# Patient Record
Sex: FEMALE | Race: WHITE | NOT HISPANIC OR LATINO | URBAN - METROPOLITAN AREA
[De-identification: names, ages, dates, MRNs, and addresses within clinical notes are randomized per-mention and may not be internally consistent; named-entity substitution may affect disease eponyms.]

---

## 2017-03-09 ENCOUNTER — OUTPATIENT (OUTPATIENT)
Dept: OUTPATIENT SERVICES | Facility: HOSPITAL | Age: 42
LOS: 1 days | End: 2017-03-09

## 2017-03-09 VITALS
HEIGHT: 62 IN | SYSTOLIC BLOOD PRESSURE: 102 MMHG | HEART RATE: 76 BPM | TEMPERATURE: 99 F | WEIGHT: 141.98 LBS | RESPIRATION RATE: 14 BRPM | DIASTOLIC BLOOD PRESSURE: 72 MMHG

## 2017-03-09 DIAGNOSIS — M77.11 LATERAL EPICONDYLITIS, RIGHT ELBOW: ICD-10-CM

## 2017-03-09 DIAGNOSIS — Z90.89 ACQUIRED ABSENCE OF OTHER ORGANS: Chronic | ICD-10-CM

## 2017-03-09 LAB
HCT VFR BLD CALC: 40.5 % — SIGNIFICANT CHANGE UP (ref 34.5–45)
HGB BLD-MCNC: 13.4 G/DL — SIGNIFICANT CHANGE UP (ref 11.5–15.5)
MCHC RBC-ENTMCNC: 31 PG — SIGNIFICANT CHANGE UP (ref 27–34)
MCHC RBC-ENTMCNC: 33.1 % — SIGNIFICANT CHANGE UP (ref 32–36)
MCV RBC AUTO: 93.8 FL — SIGNIFICANT CHANGE UP (ref 80–100)
PLATELET # BLD AUTO: 321 K/UL — SIGNIFICANT CHANGE UP (ref 150–400)
PMV BLD: 10.7 FL — SIGNIFICANT CHANGE UP (ref 7–13)
RBC # BLD: 4.32 M/UL — SIGNIFICANT CHANGE UP (ref 3.8–5.2)
RBC # FLD: 13.3 % — SIGNIFICANT CHANGE UP (ref 10.3–14.5)
WBC # BLD: 9.17 K/UL — SIGNIFICANT CHANGE UP (ref 3.8–10.5)
WBC # FLD AUTO: 9.17 K/UL — SIGNIFICANT CHANGE UP (ref 3.8–10.5)

## 2017-03-09 NOTE — H&P PST ADULT - MUSCULOSKELETAL
details… detailed exam no joint warmth/no joint erythema/decreased ROM/no joint swelling/normal strength/right elbow/no calf tenderness

## 2017-03-09 NOTE — H&P PST ADULT - FAMILY HISTORY
Father  Still living? Yes, Estimated age: 61-70  Family history of hypertension, Age at diagnosis: Age Unknown

## 2017-03-09 NOTE — H&P PST ADULT - NSANTHOSAYNRD_GEN_A_CORE
No. AKIL screening performed.  STOP BANG Legend: 0-2 = LOW Risk; 3-4 = INTERMEDIATE Risk; 5-8 = HIGH Risk

## 2017-03-09 NOTE — H&P PST ADULT - RS GEN PE MLT RESP DETAILS PC
no rhonchi/clear to auscultation bilaterally/no wheezes/respirations non-labored/good air movement/no rales/breath sounds equal

## 2017-03-09 NOTE — H&P PST ADULT - PMH
Herniated lumbar intervertebral disc  L4-5 , epidural 1 year ago - with relief  Pancreatitis  age 16

## 2017-03-09 NOTE — H&P PST ADULT - HISTORY OF PRESENT ILLNESS
This is a 42 y.o. female who presented to DR Johnston over 1 year ago , complaining of right arm discomfort . Pt evaluated , had x-ray of right arm . Pt recently evaluate by Dr Johnston . Pt has lateral epicondylitis ,right elbow. Pt now for surgery .

## 2017-03-09 NOTE — H&P PST ADULT - LYMPHATIC
supraclavicular R/posterior cervical L/supraclavicular L/anterior cervical R/anterior cervical L/posterior cervical R

## 2017-03-14 ENCOUNTER — TRANSCRIPTION ENCOUNTER (OUTPATIENT)
Age: 42
End: 2017-03-14

## 2017-03-15 ENCOUNTER — OUTPATIENT (OUTPATIENT)
Dept: OUTPATIENT SERVICES | Facility: HOSPITAL | Age: 42
LOS: 1 days | Discharge: HOME | End: 2017-03-15

## 2017-03-15 VITALS
DIASTOLIC BLOOD PRESSURE: 70 MMHG | HEART RATE: 82 BPM | RESPIRATION RATE: 16 BRPM | OXYGEN SATURATION: 98 % | TEMPERATURE: 98 F | SYSTOLIC BLOOD PRESSURE: 100 MMHG

## 2017-03-15 VITALS
WEIGHT: 141.98 LBS | RESPIRATION RATE: 14 BRPM | TEMPERATURE: 99 F | HEART RATE: 71 BPM | HEIGHT: 62 IN | OXYGEN SATURATION: 100 % | SYSTOLIC BLOOD PRESSURE: 102 MMHG | DIASTOLIC BLOOD PRESSURE: 74 MMHG

## 2017-03-15 DIAGNOSIS — Z90.89 ACQUIRED ABSENCE OF OTHER ORGANS: Chronic | ICD-10-CM

## 2017-03-15 DIAGNOSIS — M77.11 LATERAL EPICONDYLITIS, RIGHT ELBOW: ICD-10-CM

## 2017-03-15 RX ORDER — OXYCODONE HYDROCHLORIDE 5 MG/1
1 TABLET ORAL
Qty: 24 | Refills: 0
Start: 2017-03-15

## 2017-03-15 NOTE — ASU DISCHARGE PLAN (ADULT/PEDIATRIC). - NOTIFY
Numbness, color, or temperature change to extremity/Fever greater than 101/Pain not relieved by Medications/Persistent Nausea and Vomiting/Unable to Urinate/Bleeding that does not stop/Swelling that continues

## 2017-03-15 NOTE — ASU DISCHARGE PLAN (ADULT/PEDIATRIC). - NURSING INSTRUCTIONS
narcotic pain medicine is constipating, buy over the counter stool softener and take as instructed on the bottle

## 2017-05-19 ENCOUNTER — EMERGENCY (EMERGENCY)
Facility: HOSPITAL | Age: 42
LOS: 1 days | Discharge: HOME | End: 2017-05-19
Attending: EMERGENCY MEDICINE | Admitting: EMERGENCY MEDICINE
Payer: COMMERCIAL

## 2017-05-19 VITALS
RESPIRATION RATE: 16 BRPM | DIASTOLIC BLOOD PRESSURE: 81 MMHG | SYSTOLIC BLOOD PRESSURE: 128 MMHG | TEMPERATURE: 98 F | HEART RATE: 106 BPM

## 2017-05-19 DIAGNOSIS — Z90.89 ACQUIRED ABSENCE OF OTHER ORGANS: Chronic | ICD-10-CM

## 2017-05-19 LAB
ALBUMIN SERPL ELPH-MCNC: 4 G/DL — SIGNIFICANT CHANGE UP (ref 3.3–5)
ALP SERPL-CCNC: 54 U/L — SIGNIFICANT CHANGE UP (ref 40–120)
ALT FLD-CCNC: 9 U/L — SIGNIFICANT CHANGE UP (ref 4–33)
APPEARANCE UR: SIGNIFICANT CHANGE UP
AST SERPL-CCNC: 12 U/L — SIGNIFICANT CHANGE UP (ref 4–32)
B PERT DNA SPEC QL NAA+PROBE: SIGNIFICANT CHANGE UP
BACTERIA # UR AUTO: SIGNIFICANT CHANGE UP
BASE EXCESS BLDV CALC-SCNC: 0.5 MMOL/L — SIGNIFICANT CHANGE UP
BASOPHILS # BLD AUTO: 0.03 K/UL — SIGNIFICANT CHANGE UP (ref 0–0.2)
BASOPHILS NFR BLD AUTO: 0.2 % — SIGNIFICANT CHANGE UP (ref 0–2)
BILIRUB SERPL-MCNC: 0.7 MG/DL — SIGNIFICANT CHANGE UP (ref 0.2–1.2)
BILIRUB UR-MCNC: NEGATIVE — SIGNIFICANT CHANGE UP
BLOOD GAS VENOUS - CREATININE: 0.77 MG/DL — SIGNIFICANT CHANGE UP (ref 0.5–1.3)
BLOOD UR QL VISUAL: HIGH
BUN SERPL-MCNC: 9 MG/DL — SIGNIFICANT CHANGE UP (ref 7–23)
C PNEUM DNA SPEC QL NAA+PROBE: NOT DETECTED — SIGNIFICANT CHANGE UP
CALCIUM SERPL-MCNC: 8.8 MG/DL — SIGNIFICANT CHANGE UP (ref 8.4–10.5)
CHLORIDE BLDV-SCNC: 107 MMOL/L — SIGNIFICANT CHANGE UP (ref 96–108)
CHLORIDE SERPL-SCNC: 104 MMOL/L — SIGNIFICANT CHANGE UP (ref 98–107)
CO2 SERPL-SCNC: 22 MMOL/L — SIGNIFICANT CHANGE UP (ref 22–31)
COLOR SPEC: YELLOW — SIGNIFICANT CHANGE UP
CREAT SERPL-MCNC: 0.76 MG/DL — SIGNIFICANT CHANGE UP (ref 0.5–1.3)
EOSINOPHIL # BLD AUTO: 0.03 K/UL — SIGNIFICANT CHANGE UP (ref 0–0.5)
EOSINOPHIL NFR BLD AUTO: 0.2 % — SIGNIFICANT CHANGE UP (ref 0–6)
FLUAV H1 2009 PAND RNA SPEC QL NAA+PROBE: NOT DETECTED — SIGNIFICANT CHANGE UP
FLUAV H1 RNA SPEC QL NAA+PROBE: NOT DETECTED — SIGNIFICANT CHANGE UP
FLUAV H3 RNA SPEC QL NAA+PROBE: NOT DETECTED — SIGNIFICANT CHANGE UP
FLUAV SUBTYP SPEC NAA+PROBE: SIGNIFICANT CHANGE UP
FLUBV RNA SPEC QL NAA+PROBE: NOT DETECTED — SIGNIFICANT CHANGE UP
GAS PNL BLDV: 136 MMOL/L — SIGNIFICANT CHANGE UP (ref 136–146)
GLUCOSE BLDV-MCNC: 83 — SIGNIFICANT CHANGE UP (ref 70–99)
GLUCOSE SERPL-MCNC: 79 MG/DL — SIGNIFICANT CHANGE UP (ref 70–99)
GLUCOSE UR-MCNC: NEGATIVE — SIGNIFICANT CHANGE UP
HADV DNA SPEC QL NAA+PROBE: NOT DETECTED — SIGNIFICANT CHANGE UP
HBA1C BLD-MCNC: 5.2 % — SIGNIFICANT CHANGE UP (ref 4–5.6)
HCG SERPL-ACNC: < 5 MIU/ML — SIGNIFICANT CHANGE UP
HCO3 BLDV-SCNC: 23 MMOL/L — SIGNIFICANT CHANGE UP (ref 20–27)
HCOV 229E RNA SPEC QL NAA+PROBE: NOT DETECTED — SIGNIFICANT CHANGE UP
HCOV HKU1 RNA SPEC QL NAA+PROBE: NOT DETECTED — SIGNIFICANT CHANGE UP
HCOV NL63 RNA SPEC QL NAA+PROBE: NOT DETECTED — SIGNIFICANT CHANGE UP
HCOV OC43 RNA SPEC QL NAA+PROBE: NOT DETECTED — SIGNIFICANT CHANGE UP
HCT VFR BLD CALC: 40.4 % — SIGNIFICANT CHANGE UP (ref 34.5–45)
HCT VFR BLDV CALC: 43.5 % — SIGNIFICANT CHANGE UP (ref 34.5–45)
HETEROPH AB TITR SER AGGL: NEGATIVE — SIGNIFICANT CHANGE UP
HGB BLD-MCNC: 13.5 G/DL — SIGNIFICANT CHANGE UP (ref 11.5–15.5)
HGB BLDV-MCNC: 14.2 G/DL — SIGNIFICANT CHANGE UP (ref 11.5–15.5)
HMPV RNA SPEC QL NAA+PROBE: NOT DETECTED — SIGNIFICANT CHANGE UP
HPIV1 RNA SPEC QL NAA+PROBE: NOT DETECTED — SIGNIFICANT CHANGE UP
HPIV2 RNA SPEC QL NAA+PROBE: NOT DETECTED — SIGNIFICANT CHANGE UP
HPIV3 RNA SPEC QL NAA+PROBE: NOT DETECTED — SIGNIFICANT CHANGE UP
HPIV4 RNA SPEC QL NAA+PROBE: NOT DETECTED — SIGNIFICANT CHANGE UP
IMM GRANULOCYTES NFR BLD AUTO: 0.4 % — SIGNIFICANT CHANGE UP (ref 0–1.5)
KETONES UR-MCNC: SIGNIFICANT CHANGE UP
LACTATE BLDV-MCNC: 1.4 MMOL/L — SIGNIFICANT CHANGE UP (ref 0.5–2)
LEUKOCYTE ESTERASE UR-ACNC: HIGH
LYMPHOCYTES # BLD AUTO: 1.05 K/UL — SIGNIFICANT CHANGE UP (ref 1–3.3)
LYMPHOCYTES # BLD AUTO: 7.4 % — LOW (ref 13–44)
M PNEUMO DNA SPEC QL NAA+PROBE: NOT DETECTED — SIGNIFICANT CHANGE UP
MCHC RBC-ENTMCNC: 31.1 PG — SIGNIFICANT CHANGE UP (ref 27–34)
MCHC RBC-ENTMCNC: 33.4 % — SIGNIFICANT CHANGE UP (ref 32–36)
MCV RBC AUTO: 93.1 FL — SIGNIFICANT CHANGE UP (ref 80–100)
MONOCYTES # BLD AUTO: 0.66 K/UL — SIGNIFICANT CHANGE UP (ref 0–0.9)
MONOCYTES NFR BLD AUTO: 4.7 % — SIGNIFICANT CHANGE UP (ref 2–14)
MUCOUS THREADS # UR AUTO: SIGNIFICANT CHANGE UP
NEUTROPHILS # BLD AUTO: 12.31 K/UL — HIGH (ref 1.8–7.4)
NEUTROPHILS NFR BLD AUTO: 87.1 % — HIGH (ref 43–77)
NITRITE UR-MCNC: NEGATIVE — SIGNIFICANT CHANGE UP
NON-SQ EPI CELLS # UR AUTO: <1 — SIGNIFICANT CHANGE UP
PCO2 BLDV: 45 MMHG — SIGNIFICANT CHANGE UP (ref 41–51)
PH BLDV: 7.37 PH — SIGNIFICANT CHANGE UP (ref 7.32–7.43)
PH UR: 6 — SIGNIFICANT CHANGE UP (ref 4.6–8)
PLATELET # BLD AUTO: 227 K/UL — SIGNIFICANT CHANGE UP (ref 150–400)
PMV BLD: 10.2 FL — SIGNIFICANT CHANGE UP (ref 7–13)
PO2 BLDV: 27 MMHG — LOW (ref 35–40)
POTASSIUM BLDV-SCNC: 3.4 MMOL/L — SIGNIFICANT CHANGE UP (ref 3.4–4.5)
POTASSIUM SERPL-MCNC: 4.1 MMOL/L — SIGNIFICANT CHANGE UP (ref 3.5–5.3)
POTASSIUM SERPL-SCNC: 4.1 MMOL/L — SIGNIFICANT CHANGE UP (ref 3.5–5.3)
PROT SERPL-MCNC: 7.3 G/DL — SIGNIFICANT CHANGE UP (ref 6–8.3)
PROT UR-MCNC: 100 — HIGH
RBC # BLD: 4.34 M/UL — SIGNIFICANT CHANGE UP (ref 3.8–5.2)
RBC # FLD: 13.1 % — SIGNIFICANT CHANGE UP (ref 10.3–14.5)
RBC CASTS # UR COMP ASSIST: HIGH (ref 0–?)
RSV RNA SPEC QL NAA+PROBE: NOT DETECTED — SIGNIFICANT CHANGE UP
RV+EV RNA SPEC QL NAA+PROBE: NOT DETECTED — SIGNIFICANT CHANGE UP
SAO2 % BLDV: 44.2 % — LOW (ref 60–85)
SODIUM SERPL-SCNC: 141 MMOL/L — SIGNIFICANT CHANGE UP (ref 135–145)
SP GR SPEC: 1.03 — HIGH (ref 1–1.03)
SQUAMOUS # UR AUTO: SIGNIFICANT CHANGE UP
TROPONIN T SERPL-MCNC: < 0.06 NG/ML — SIGNIFICANT CHANGE UP (ref 0–0.06)
TROPONIN T SERPL-MCNC: < 0.06 NG/ML — SIGNIFICANT CHANGE UP (ref 0–0.06)
TSH SERPL-MCNC: 1.13 UIU/ML — SIGNIFICANT CHANGE UP (ref 0.27–4.2)
UROBILINOGEN FLD QL: NORMAL E.U. — SIGNIFICANT CHANGE UP (ref 0.1–0.2)
WBC # BLD: 14.14 K/UL — HIGH (ref 3.8–10.5)
WBC # FLD AUTO: 14.14 K/UL — HIGH (ref 3.8–10.5)
WBC UR QL: HIGH (ref 0–?)

## 2017-05-19 PROCEDURE — 99220: CPT

## 2017-05-19 PROCEDURE — 71020: CPT | Mod: 26

## 2017-05-19 PROCEDURE — 71275 CT ANGIOGRAPHY CHEST: CPT | Mod: 26

## 2017-05-19 RX ORDER — CEFTRIAXONE 500 MG/1
1 INJECTION, POWDER, FOR SOLUTION INTRAMUSCULAR; INTRAVENOUS ONCE
Qty: 0 | Refills: 0 | Status: COMPLETED | OUTPATIENT
Start: 2017-05-19 | End: 2017-05-19

## 2017-05-19 RX ORDER — KETOROLAC TROMETHAMINE 30 MG/ML
30 SYRINGE (ML) INJECTION ONCE
Qty: 0 | Refills: 0 | Status: DISCONTINUED | OUTPATIENT
Start: 2017-05-19 | End: 2017-05-19

## 2017-05-19 RX ORDER — CEFTRIAXONE 500 MG/1
1 INJECTION, POWDER, FOR SOLUTION INTRAMUSCULAR; INTRAVENOUS EVERY 24 HOURS
Qty: 0 | Refills: 0 | Status: DISCONTINUED | OUTPATIENT
Start: 2017-05-20 | End: 2017-05-23

## 2017-05-19 RX ORDER — SODIUM CHLORIDE 9 MG/ML
2000 INJECTION INTRAMUSCULAR; INTRAVENOUS; SUBCUTANEOUS ONCE
Qty: 0 | Refills: 0 | Status: COMPLETED | OUTPATIENT
Start: 2017-05-19 | End: 2017-05-19

## 2017-05-19 RX ORDER — SODIUM CHLORIDE 9 MG/ML
1000 INJECTION INTRAMUSCULAR; INTRAVENOUS; SUBCUTANEOUS
Qty: 0 | Refills: 0 | Status: DISCONTINUED | OUTPATIENT
Start: 2017-05-19 | End: 2017-05-20

## 2017-05-19 RX ORDER — ACETAMINOPHEN 500 MG
975 TABLET ORAL ONCE
Qty: 0 | Refills: 0 | Status: COMPLETED | OUTPATIENT
Start: 2017-05-19 | End: 2017-05-19

## 2017-05-19 RX ORDER — SODIUM CHLORIDE 9 MG/ML
1000 INJECTION INTRAMUSCULAR; INTRAVENOUS; SUBCUTANEOUS ONCE
Qty: 0 | Refills: 0 | Status: COMPLETED | OUTPATIENT
Start: 2017-05-19 | End: 2017-05-19

## 2017-05-19 RX ADMIN — Medication 975 MILLIGRAM(S): at 17:05

## 2017-05-19 RX ADMIN — SODIUM CHLORIDE 2000 MILLILITER(S): 9 INJECTION INTRAMUSCULAR; INTRAVENOUS; SUBCUTANEOUS at 12:32

## 2017-05-19 RX ADMIN — SODIUM CHLORIDE 150 MILLILITER(S): 9 INJECTION INTRAMUSCULAR; INTRAVENOUS; SUBCUTANEOUS at 21:47

## 2017-05-19 RX ADMIN — SODIUM CHLORIDE 2000 MILLILITER(S): 9 INJECTION INTRAMUSCULAR; INTRAVENOUS; SUBCUTANEOUS at 17:36

## 2017-05-19 RX ADMIN — Medication 30 MILLIGRAM(S): at 23:56

## 2017-05-19 RX ADMIN — CEFTRIAXONE 100 GRAM(S): 500 INJECTION, POWDER, FOR SOLUTION INTRAMUSCULAR; INTRAVENOUS at 15:52

## 2017-05-19 NOTE — ED CDU PROVIDER NOTE - PROGRESS NOTE DETAILS
PHILOMENA Sesay: Received signout from PHILOMENA Lewis- pt is afebrile at this time, however she remains tachycardic to 104. Pending CE #2, will hydrate, repeat labs in the am and continue qd rocephin. PA Baseil: Pt c/o chest pain, similar to previous episode. EKG rechecked, unchanged. CP is positional with laying back and relieved with sitting up/forward. Will give toradol, continue to monitor and observe, consider echo in the am. Azalia CDU attendin yo woman presenting with episodic fever for 2 days, episode of chest pain.  TSH wnl, no fever overnight.  Patient did report episode of chest pain overnight.  Serial troponin negative.  No evidence of ACS, pericarditis, myocarditis, pulmonary embolism,  pneumothorax, pneumonia, Zoster, or esophageal perforation.  Historically not abrupt in onset, tearing or ripping, pulses symmetric, no e/o aortic dissection.  Echo pending, prelim blood culture pending. PA Baseil: Pt c/o chest pain, similar to previous episode. EKG rechecked, unchanged. CP is positional with laying back and relieved with sitting up/forward. Will give toradol, continue to monitor and observe, consider echo in the am.  Azalia att: I performed a face to face evaluation of this patient and obtained a history and performed a full exam.  I agree with the history, physical exam and plan of the PA. PHILOMENA Payton: pt NAD, vitals improved, afebrile. Discussed results of labs/imaging with the patient. Advised to follow up with PCP PHILOMENA Payton: pt NAD, vitals improved, afebrile. Patient finishing second dose of ceftriaxone now. Discussed results of labs/imaging with the patient. Advised to follow up with PCP. PHILOMENA Payton: pt NAD, vitals improved, afebrile. Patient finishing second dose of ceftriaxone now. Discussed results of labs/imaging with the patient. Advised to follow up with PCP.  Azalia att: I performed a face to face evaluation of this patient and obtained a history and performed a full exam.  I agree with the history, physical exam and plan of the PA. Azalia CDU attending discharge summary: 43 yo woman presenting with episodic fever for 2 days, episode of chest pain.  TSH wnl, no fever overnight.  Patient did report episode of chest pain overnight.  Preliminary blood cultures negative, urine culture negative.  Reevaluation, no meningismus, cardiac RRR no m/r/gs, chest clear, abdomen s/nt/nd, no rash.  No e/o encephalitis, bacterial meningitis, pneumonia, soft tissue infection, joint infection, meningococcemia, intraabdominal infection such as appendicitis, cholecystitis, no e/o UTI, no risk factors or findings concerning for endocarditis, no e/o lyme disease, or RMSF.  Patient informed of ED visit findings, understands plan.  Patient provided with written and further verbal instructions not included in discharge paperwork.  Patient instructed to follow up with their primary care physician in 2-3 days and return for new, worsened, or persistent symptoms.  Rx keflex.

## 2017-05-19 NOTE — ED CDU PROVIDER NOTE - ATTENDING CONTRIBUTION TO CARE
Dr. Rincon Case of a  41 y/o female patient that presented to the ED due to episodic fever for 2 days and episode of chest pain. Labs within normal, sent to CDU for observation and monitoring of blood cultures. Agree with PA's physical exam, assessment and documentation

## 2017-05-19 NOTE — ED ADULT NURSE REASSESSMENT NOTE - NS ED NURSE REASSESS COMMENT FT1
pt currently denies chest pain  or SOB. c/o body wide chills. pt currently febrile and tachycardic again. will give Tylenol as per Dr. Vieyra s/p CTA

## 2017-05-19 NOTE — ED PROVIDER NOTE - OBJECTIVE STATEMENT
41 yo woman presenting with fever for 2 days.  Waxing and waning, onset at rest, nonradiating, not better or worse with anything, moderate in severity.  Reports an isolated episode of chest pain, left sided, sharp, lasted seconds, onset at rest, moderate in severity.  No cough, sore throat, runny nose, abdominal pain, dysuria or frequency, rash, tick bite or outdoor activity c/w tick.

## 2017-05-19 NOTE — ED PROVIDER NOTE - PHYSICAL EXAMINATION
Azalia att: General: Well appearing, nontoxic, no acute distress; Head: Normocephalic Atraumatic; Eyes: PERRL, EOMI; ENT: Airway patent; Neck: supple, no meningismus; Chest: Lungs clear to auscultation bilateral; Cardiac: Regular rate and rhythm, no murmurs, rubs or gallops; Abdomen: soft, nontender, nondistended; no guarding or rebound; Musculoskeletal: Calves symmetric, nontender, no palpable cord; Skin: No rash, palms and soles clear, nails and digits wnl, normal skin tone; Neuro: Alert and Oriented to person, place, and time; No focal deficit, CN 2-12 symmetric and intact

## 2017-05-19 NOTE — ED CDU PROVIDER NOTE - PLAN OF CARE
Take Tylenol 650mg every 4 hours as needed for fever or pain.  Take Keflex 500mg three times a day for 7 days - for urinary tract infection.  Follow up with your primary care physician within 1 week for further evaluation.  Return to the Emergency Department for any new, worsening or concerning symptoms.

## 2017-05-19 NOTE — ED CDU PROVIDER NOTE - OBJECTIVE STATEMENT
41 yo woman presenting with fever for 2 days.  Waxing and waning, onset at rest, nonradiating, not better or worse with anything, moderate in severity.  Reports an isolated episode of chest pain, left sided, sharp, lasted seconds, onset at rest, moderate in severity.  No cough, sore throat, runny nose, abdominal pain, dysuria or frequency, rash, tick bite or outdoor activity c/w tick.    CDU: 41 yo old no PMHX with fever for 2 days, intermittent. And isoloated CP. one episode of vomiting, nausea. No recent uri, or illness, no recent travel, abdominal pain, urinary sx.

## 2017-05-19 NOTE — ED ADULT TRIAGE NOTE - CHIEF COMPLAINT QUOTE
Pt c/o fever  , L sided chest discomfort now resolved ,  body ache , and 1 episode of N/V last night.  Denies diarrhea, sob.  Pt took advil x 2 at 8am

## 2017-05-19 NOTE — ED PROVIDER NOTE - CARE PLAN
Principal Discharge DX:	Sinus tachycardia  Secondary Diagnosis:	Acute chest pain  Secondary Diagnosis:	Fever, unspecified fever cause

## 2017-05-20 VITALS
DIASTOLIC BLOOD PRESSURE: 71 MMHG | RESPIRATION RATE: 16 BRPM | SYSTOLIC BLOOD PRESSURE: 119 MMHG | OXYGEN SATURATION: 100 % | HEART RATE: 98 BPM | TEMPERATURE: 98 F

## 2017-05-20 LAB
ALBUMIN SERPL ELPH-MCNC: 3.1 G/DL — LOW (ref 3.3–5)
ALP SERPL-CCNC: 45 U/L — SIGNIFICANT CHANGE UP (ref 40–120)
ALT FLD-CCNC: 17 U/L — SIGNIFICANT CHANGE UP (ref 4–33)
AST SERPL-CCNC: 30 U/L — SIGNIFICANT CHANGE UP (ref 4–32)
BASE EXCESS BLDV CALC-SCNC: -1.8 MMOL/L — SIGNIFICANT CHANGE UP
BASOPHILS # BLD AUTO: 0.02 K/UL — SIGNIFICANT CHANGE UP (ref 0–0.2)
BASOPHILS NFR BLD AUTO: 0.2 % — SIGNIFICANT CHANGE UP (ref 0–2)
BILIRUB SERPL-MCNC: 0.2 MG/DL — SIGNIFICANT CHANGE UP (ref 0.2–1.2)
BLOOD GAS VENOUS - CREATININE: 0.56 MG/DL — SIGNIFICANT CHANGE UP (ref 0.5–1.3)
BUN SERPL-MCNC: 5 MG/DL — LOW (ref 7–23)
CALCIUM SERPL-MCNC: 7.7 MG/DL — LOW (ref 8.4–10.5)
CHLORIDE BLDV-SCNC: 116 MMOL/L — HIGH (ref 96–108)
CHLORIDE SERPL-SCNC: 110 MMOL/L — HIGH (ref 98–107)
CK MB BLD-MCNC: 1 NG/ML — SIGNIFICANT CHANGE UP (ref 1–4.7)
CK SERPL-CCNC: 87 U/L — SIGNIFICANT CHANGE UP (ref 25–170)
CO2 SERPL-SCNC: 20 MMOL/L — LOW (ref 22–31)
CREAT SERPL-MCNC: 0.59 MG/DL — SIGNIFICANT CHANGE UP (ref 0.5–1.3)
EOSINOPHIL # BLD AUTO: 0.23 K/UL — SIGNIFICANT CHANGE UP (ref 0–0.5)
EOSINOPHIL NFR BLD AUTO: 2.3 % — SIGNIFICANT CHANGE UP (ref 0–6)
GAS PNL BLDV: 133 MMOL/L — LOW (ref 136–146)
GLUCOSE BLDV-MCNC: 97 — SIGNIFICANT CHANGE UP (ref 70–99)
GLUCOSE SERPL-MCNC: 99 MG/DL — SIGNIFICANT CHANGE UP (ref 70–99)
HCO3 BLDV-SCNC: 23 MMOL/L — SIGNIFICANT CHANGE UP (ref 20–27)
HCT VFR BLD CALC: 34.5 % — SIGNIFICANT CHANGE UP (ref 34.5–45)
HCT VFR BLDV CALC: 35.2 % — SIGNIFICANT CHANGE UP (ref 34.5–45)
HGB BLD-MCNC: 11.2 G/DL — LOW (ref 11.5–15.5)
HGB BLDV-MCNC: 11.4 G/DL — LOW (ref 11.5–15.5)
IMM GRANULOCYTES NFR BLD AUTO: 0.4 % — SIGNIFICANT CHANGE UP (ref 0–1.5)
LACTATE BLDV-MCNC: 0.6 MMOL/L — SIGNIFICANT CHANGE UP (ref 0.5–2)
LYMPHOCYTES # BLD AUTO: 1.34 K/UL — SIGNIFICANT CHANGE UP (ref 1–3.3)
LYMPHOCYTES # BLD AUTO: 13.4 % — SIGNIFICANT CHANGE UP (ref 13–44)
MCHC RBC-ENTMCNC: 30.3 PG — SIGNIFICANT CHANGE UP (ref 27–34)
MCHC RBC-ENTMCNC: 32.5 % — SIGNIFICANT CHANGE UP (ref 32–36)
MCV RBC AUTO: 93.2 FL — SIGNIFICANT CHANGE UP (ref 80–100)
MONOCYTES # BLD AUTO: 0.81 K/UL — SIGNIFICANT CHANGE UP (ref 0–0.9)
MONOCYTES NFR BLD AUTO: 8.1 % — SIGNIFICANT CHANGE UP (ref 2–14)
NEUTROPHILS # BLD AUTO: 7.55 K/UL — HIGH (ref 1.8–7.4)
NEUTROPHILS NFR BLD AUTO: 75.6 % — SIGNIFICANT CHANGE UP (ref 43–77)
PCO2 BLDV: 33 MMHG — LOW (ref 41–51)
PH BLDV: 7.43 PH — SIGNIFICANT CHANGE UP (ref 7.32–7.43)
PLATELET # BLD AUTO: 214 K/UL — SIGNIFICANT CHANGE UP (ref 150–400)
PMV BLD: 10.2 FL — SIGNIFICANT CHANGE UP (ref 7–13)
PO2 BLDV: 59 MMHG — HIGH (ref 35–40)
POTASSIUM BLDV-SCNC: 3.7 MMOL/L — SIGNIFICANT CHANGE UP (ref 3.4–4.5)
POTASSIUM SERPL-MCNC: 4.4 MMOL/L — SIGNIFICANT CHANGE UP (ref 3.5–5.3)
POTASSIUM SERPL-SCNC: 4.4 MMOL/L — SIGNIFICANT CHANGE UP (ref 3.5–5.3)
PROT SERPL-MCNC: 6.3 G/DL — SIGNIFICANT CHANGE UP (ref 6–8.3)
RBC # BLD: 3.7 M/UL — LOW (ref 3.8–5.2)
RBC # FLD: 13.4 % — SIGNIFICANT CHANGE UP (ref 10.3–14.5)
SAO2 % BLDV: 92.1 % — HIGH (ref 60–85)
SODIUM SERPL-SCNC: 143 MMOL/L — SIGNIFICANT CHANGE UP (ref 135–145)
SPECIMEN SOURCE: SIGNIFICANT CHANGE UP
TROPONIN T SERPL-MCNC: < 0.06 NG/ML — SIGNIFICANT CHANGE UP (ref 0–0.06)
WBC # BLD: 9.99 K/UL — SIGNIFICANT CHANGE UP (ref 3.8–10.5)
WBC # FLD AUTO: 9.99 K/UL — SIGNIFICANT CHANGE UP (ref 3.8–10.5)

## 2017-05-20 PROCEDURE — 99217: CPT

## 2017-05-20 PROCEDURE — 93306 TTE W/DOPPLER COMPLETE: CPT | Mod: 26

## 2017-05-20 RX ORDER — METOCLOPRAMIDE HCL 10 MG
10 TABLET ORAL ONCE
Qty: 0 | Refills: 0 | Status: DISCONTINUED | OUTPATIENT
Start: 2017-05-20 | End: 2017-05-20

## 2017-05-20 RX ORDER — METOCLOPRAMIDE HCL 10 MG
10 TABLET ORAL ONCE
Qty: 0 | Refills: 0 | Status: COMPLETED | OUTPATIENT
Start: 2017-05-20 | End: 2017-05-20

## 2017-05-20 RX ORDER — CEPHALEXIN 500 MG
1 CAPSULE ORAL
Qty: 21 | Refills: 0
Start: 2017-05-20 | End: 2017-05-27

## 2017-05-20 RX ADMIN — Medication 30 MILLIGRAM(S): at 00:25

## 2017-05-20 RX ADMIN — Medication 10 MILLIGRAM(S): at 16:03

## 2017-05-20 RX ADMIN — CEFTRIAXONE 100 GRAM(S): 500 INJECTION, POWDER, FOR SOLUTION INTRAMUSCULAR; INTRAVENOUS at 16:56

## 2017-05-20 RX ADMIN — SODIUM CHLORIDE 150 MILLILITER(S): 9 INJECTION INTRAMUSCULAR; INTRAVENOUS; SUBCUTANEOUS at 06:32

## 2017-05-21 LAB — BACTERIA UR CULT: SIGNIFICANT CHANGE UP

## 2017-05-24 LAB
BACTERIA BLD CULT: SIGNIFICANT CHANGE UP
BACTERIA BLD CULT: SIGNIFICANT CHANGE UP

## 2018-09-27 ENCOUNTER — RESULT REVIEW (OUTPATIENT)
Age: 43
End: 2018-09-27

## 2020-03-18 ENCOUNTER — APPOINTMENT (OUTPATIENT)
Dept: UROGYNECOLOGY | Facility: CLINIC | Age: 45
End: 2020-03-18

## 2020-03-30 PROBLEM — Z00.00 ENCOUNTER FOR PREVENTIVE HEALTH EXAMINATION: Status: ACTIVE | Noted: 2020-03-30

## 2020-05-08 ENCOUNTER — APPOINTMENT (OUTPATIENT)
Dept: UROGYNECOLOGY | Facility: CLINIC | Age: 45
End: 2020-05-08

## 2020-10-14 ENCOUNTER — RESULT REVIEW (OUTPATIENT)
Age: 45
End: 2020-10-14

## 2022-03-03 ENCOUNTER — APPOINTMENT (OUTPATIENT)
Dept: OBGYN | Facility: CLINIC | Age: 47
End: 2022-03-03
Payer: COMMERCIAL

## 2022-03-03 ENCOUNTER — TRANSCRIPTION ENCOUNTER (OUTPATIENT)
Age: 47
End: 2022-03-03

## 2022-03-03 VITALS
BODY MASS INDEX: 25.76 KG/M2 | WEIGHT: 140 LBS | DIASTOLIC BLOOD PRESSURE: 70 MMHG | SYSTOLIC BLOOD PRESSURE: 118 MMHG | HEIGHT: 62 IN

## 2022-03-03 DIAGNOSIS — Z80.41 FAMILY HISTORY OF MALIGNANT NEOPLASM OF OVARY: ICD-10-CM

## 2022-03-03 PROCEDURE — 99203 OFFICE O/P NEW LOW 30 MIN: CPT

## 2022-03-05 NOTE — PLAN
[FreeTextEntry1] : With a large right adnexal mass as well as her history of abnormal bleeding patient o'clock desires definitive surgical treatment with TLH RSO and left salpingectomy patient is well aware of the risks and alternatives of this course of therapy including but not limited to infection bleeding injury to adjacent structures and the need for laparotomy.

## 2022-03-05 NOTE — HISTORY OF PRESENT ILLNESS
[FreeTextEntry1] : Ms. Vital (lmp2/) P3 presenting for surgical consultation for ovarian cysts and AUB. She states that she has a known hx of ovarian cysts. She states she has been getting yearly sonos for the past 4-5 years. She reports having some irregular menses in the past had had a normal EMBx (10/2020). She had been having worsening back and pelvic pain. She is interested in a total hysterectomy and bilateral salpingectomy. \par \par Ova (21)- Ova 4.4 (low risk)\par Pt had BRCA testing and is awaiting results\par \par [place]  (3/2/22):  \par RO 8X6X7 cm septated cystic mass in the rght ovary without significant solid component or enhancement. Cyst is large than noted previously. While this may be a benign epithelial lesion, it is indeterminate since it has increased in size. \par LO: normal \par No enlarged lymph nodes \par \par \par OBHx:  x3\par GYNHx: denied hx of abn pap, stds\par PMSH: s/p tonsillectomy \par Family Hx: maternal grandfarthers sisters have had ovarian cancer? \par

## 2022-03-15 NOTE — H&P PST ADULT - GENERAL
"Subjective:       Patient ID: Tanisha Mccullough is a 28 y.o. female.    Vitals:  height is 5' 5" (1.651 m) and weight is 72.6 kg (160 lb). Her temperature is 97.3 °F (36.3 °C). Her blood pressure is 131/73 and her pulse is 82. Her respiration is 17 and oxygen saturation is 98%.     Chief Complaint: Abscess and Insect Bite    28-year-old female presents urgent care clinic for evaluation.  She is concern for possible insect/spider bite few days ago.  There is some redness progressively worsened.  Very tender to touch and swelling/redness has spread.  Has some mild intermittent headaches since.  Not taking anything for symptoms.  No other associated symptoms.    Abscess  Chronicity:  NewProgression Since Onset: worsening  Associated Symptoms: no fever, no chills, no sweats  Characteristics: painful, redness and swelling    Treatments Tried:  Nothing  Relieved by:  Nothing  Worsened by:  Nothing      Constitution: Negative for activity change, appetite change, chills, sweating, fatigue, fever and generalized weakness.   HENT: Negative for ear pain, hearing loss, facial swelling, congestion, postnasal drip, sinus pain, sinus pressure, sore throat, trouble swallowing and voice change.    Neck: Negative for neck pain, neck stiffness and painful lymph nodes.   Cardiovascular: Negative for chest pain, leg swelling, palpitations, sob on exertion and passing out.   Eyes: Negative for eye discharge, eye pain, photophobia, vision loss, double vision and blurred vision.   Respiratory: Negative for chest tightness, cough, sputum production, bloody sputum, COPD, shortness of breath, stridor, wheezing and asthma.    Gastrointestinal: Negative for abdominal pain, nausea, vomiting, constipation, diarrhea, bright red blood in stool, rectal bleeding, heartburn and bowel incontinence.   Genitourinary: Negative for dysuria, frequency, urgency, urine decreased, flank pain, bladder incontinence and hematuria.   Musculoskeletal: Positive for " pain. Negative for trauma, joint pain, joint swelling, abnormal ROM of joint, muscle cramps and muscle ache.   Skin: Positive for wound, erythema and abscess. Negative for color change, pale and rash.   Allergic/Immunologic: Negative for seasonal allergies, asthma and immunocompromised state.   Neurological: Negative for dizziness, history of vertigo, light-headedness, passing out, facial drooping, speech difficulty, coordination disturbances, loss of balance, headaches, disorientation, altered mental status, loss of consciousness, numbness, tingling and seizures.   Hematologic/Lymphatic: Negative for swollen lymph nodes, easy bruising/bleeding and trouble clotting. Does not bruise/bleed easily.   Psychiatric/Behavioral: Negative for altered mental status and disorientation.         Past Medical History:   Diagnosis Date    Herpes simplex without mention of complication        Objective:      Physical Exam   Constitutional: She appears well-developed. She is cooperative.  Non-toxic appearance. She does not appear ill. No distress.   HENT:   Head: Normocephalic and atraumatic.   Ears:   Right Ear: Hearing, external ear and ear canal normal.   Left Ear: Hearing, external ear and ear canal normal.   Nose: Nose normal.   Mouth/Throat: Uvula is midline, oropharynx is clear and moist and mucous membranes are normal. Mucous membranes are moist. No posterior oropharyngeal edema. No tonsillar exudate. Oropharynx is clear.   Eyes: Conjunctivae, EOM and lids are normal. Pupils are equal, round, and reactive to light. Right eye exhibits no discharge. Left eye exhibits no discharge.      extraocular movement intact   Neck: Neck supple. No neck rigidity present.   Cardiovascular: Normal rate, regular rhythm and normal pulses.   Pulmonary/Chest: Effort normal. No accessory muscle usage. No respiratory distress. She has no wheezes. She exhibits no tenderness.   Abdominal: Normal appearance. She exhibits no distension. Soft. There  is no abdominal tenderness.   Musculoskeletal: Normal range of motion.         General: Normal range of motion.      Right lower leg: No edema.      Left lower leg: No edema.      Comments: Moves all extremities with normal tone, strength, and ROM.   Neurological: no focal deficit. She is alert and at baseline. She has normal motor skills, normal sensation and intact cranial nerves. She displays no weakness, facial symmetry and normal reflexes. No cranial nerve deficit or sensory deficit. She exhibits normal muscle tone. Gait and coordination normal. Coordination normal.   Skin: Skin is warm, dry, not diaphoretic and no rash. Capillary refill takes less than 2 seconds. erythema         Comments: There is small abscess and surrounding cellulitis on left upper back   Psychiatric: Her behavior is normal. Mood and thought content normal.   Nursing note and vitals reviewed.            Assessment:       1. Cellulitis of trunk, unspecified site of trunk    2. Cellulitis and abscess of trunk        On exam, patient is nontoxic appearing and vitals are stable.  Patient is essentially neurovascularly intact on exam.   no concern for anaphylaxis. Mild cellulitis and abscess with 22 gauge needle with moderate purulence expressed.  Patient tolerated well.  Patient was prescribed medications and recommended OTC treatments for their symptoms.  If symptoms do not improve/worsens, patient was referred back to PCP for continued outpatient workup and management.      Patient was instructed to return for re-evaluation for any worsening or change in current symptoms. Strict ED versus clinic precautions given in depth. Discharge and follow-up instructions given verbally/printed with the patient who expressed understanding and willingness to comply with my recommendations.  Patient verbalized understanding and agreed with the entirety of plan of care.     Note dictated with voice recognition software, please excuse any grammatical  errors.    Plan:         Cellulitis of trunk, unspecified site of trunk  -     mupirocin (BACTROBAN) 2 % ointment; Apply topically 3 (three) times daily. for 7 days  Dispense: 22 g; Refill: 0  -     sulfamethoxazole-trimethoprim 800-160mg (BACTRIM DS) 800-160 mg Tab; Take 1 tablet by mouth 2 (two) times daily with meals. for 7 days  Dispense: 14 tablet; Refill: 0    Cellulitis and abscess of trunk  -     mupirocin (BACTROBAN) 2 % ointment; Apply topically 3 (three) times daily. for 7 days  Dispense: 22 g; Refill: 0  -     sulfamethoxazole-trimethoprim 800-160mg (BACTRIM DS) 800-160 mg Tab; Take 1 tablet by mouth 2 (two) times daily with meals. for 7 days  Dispense: 14 tablet; Refill: 0  -     Incision & Drainage              Additional MDM:     Heart Failure Score:   COPD = No      Patient Instructions   PLEASE READ YOUR DISCHARGE INSTRUCTIONS ENTIRELY AS IT CONTAINS IMPORTANT INFORMATION.    Please take the antibiotics to completion. Please supplement with OTC probiotics and yogurt.     keep open to air.  Use the antibiotic ointment to the wound 2-3x to open wound as directed for 1-2 weeks.     Do not submerge or go swimming for 1-2 weeks until wound is fully healed.    Take OTC Tylenol or anti-inflammatory as needed for pain. If no contraindication.      Please return or see your primary care doctor for signs of worsening infection (purulent drainage, fever, worsening swelling/redness/pain, inability to move your extremity).        Please arrange follow up with your primary medical clinic as soon as possible. You must understand that you've received an Urgent Care treatment only and that you may be released before all of your medical problems are known or treated. You, the patient, will arrange for follow up as instructed. If your symptoms worsen or fail to improve you should go to the Emergency Room.    WE CANNOT RULE OUT ALL POSSIBLE CAUSES OF YOUR SYMPTOMS IN THE URGENT CARE SETTING PLEASE GO TO THE ER IF YOU  FEELS YOUR CONDITION IS WORSENING OR YOU WOULD LIKE EMERGENT EVALUATION.      Follow-up care  Follow up with your healthcare provider as advised. If you have stitches or staples, be sure to return as directed to have them removed.    When to seek medical advice  Call your healthcare provider right away if any of these occur:  · Wound bleeding not controlled by direct pressure  · Signs of infection, including increasing pain in the wound, increasing wound redness or swelling, or pus or bad odor coming from the wound  · Fever of 100.4°F (38.ºC) or as directed by your health care provider  · Stitches or staples come apart or fall out or surgical tape falls off before 7 days  · Wound edges re-open  · Wound changes colors  · Numbness or weakness in the affected hand   · Decreased movement of the hand  Date Last Reviewed: 6/10/2015  © 4668-9870 [employer]. [address] [place] [address]. All rights reserved. This information is not intended as a substitute for professional medical care. Always follow your healthcare professional's instructions.         details…

## 2022-04-08 ENCOUNTER — OUTPATIENT (OUTPATIENT)
Dept: OUTPATIENT SERVICES | Facility: HOSPITAL | Age: 47
LOS: 1 days | End: 2022-04-08
Payer: COMMERCIAL

## 2022-04-08 VITALS
OXYGEN SATURATION: 97 % | DIASTOLIC BLOOD PRESSURE: 85 MMHG | RESPIRATION RATE: 18 BRPM | HEART RATE: 92 BPM | TEMPERATURE: 98 F | HEIGHT: 62 IN | SYSTOLIC BLOOD PRESSURE: 123 MMHG | WEIGHT: 139.99 LBS

## 2022-04-08 DIAGNOSIS — Z87.39 PERSONAL HISTORY OF OTHER DISEASES OF THE MUSCULOSKELETAL SYSTEM AND CONNECTIVE TISSUE: Chronic | ICD-10-CM

## 2022-04-08 DIAGNOSIS — Z01.818 ENCOUNTER FOR OTHER PREPROCEDURAL EXAMINATION: ICD-10-CM

## 2022-04-08 DIAGNOSIS — N93.9 ABNORMAL UTERINE AND VAGINAL BLEEDING, UNSPECIFIED: ICD-10-CM

## 2022-04-08 DIAGNOSIS — R19.09 OTHER INTRA-ABDOMINAL AND PELVIC SWELLING, MASS AND LUMP: ICD-10-CM

## 2022-04-08 DIAGNOSIS — Z90.89 ACQUIRED ABSENCE OF OTHER ORGANS: Chronic | ICD-10-CM

## 2022-04-08 LAB
A1C WITH ESTIMATED AVERAGE GLUCOSE RESULT: 5.2 % — SIGNIFICANT CHANGE UP (ref 4–5.6)
ANION GAP SERPL CALC-SCNC: 10 MMOL/L — SIGNIFICANT CHANGE UP (ref 5–17)
BLD GP AB SCN SERPL QL: NEGATIVE — SIGNIFICANT CHANGE UP
BUN SERPL-MCNC: 11 MG/DL — SIGNIFICANT CHANGE UP (ref 7–23)
CALCIUM SERPL-MCNC: 9 MG/DL — SIGNIFICANT CHANGE UP (ref 8.4–10.5)
CHLORIDE SERPL-SCNC: 103 MMOL/L — SIGNIFICANT CHANGE UP (ref 96–108)
CO2 SERPL-SCNC: 26 MMOL/L — SIGNIFICANT CHANGE UP (ref 22–31)
CREAT SERPL-MCNC: 0.68 MG/DL — SIGNIFICANT CHANGE UP (ref 0.5–1.3)
EGFR: 107 ML/MIN/1.73M2 — SIGNIFICANT CHANGE UP
ESTIMATED AVERAGE GLUCOSE: 103 MG/DL — SIGNIFICANT CHANGE UP (ref 68–114)
GLUCOSE SERPL-MCNC: 79 MG/DL — SIGNIFICANT CHANGE UP (ref 70–99)
HCT VFR BLD CALC: 40 % — SIGNIFICANT CHANGE UP (ref 34.5–45)
HGB BLD-MCNC: 12.7 G/DL — SIGNIFICANT CHANGE UP (ref 11.5–15.5)
MCHC RBC-ENTMCNC: 29.4 PG — SIGNIFICANT CHANGE UP (ref 27–34)
MCHC RBC-ENTMCNC: 31.8 GM/DL — LOW (ref 32–36)
MCV RBC AUTO: 92.6 FL — SIGNIFICANT CHANGE UP (ref 80–100)
NRBC # BLD: 0 /100 WBCS — SIGNIFICANT CHANGE UP (ref 0–0)
PLATELET # BLD AUTO: 270 K/UL — SIGNIFICANT CHANGE UP (ref 150–400)
POTASSIUM SERPL-MCNC: 3.9 MMOL/L — SIGNIFICANT CHANGE UP (ref 3.5–5.3)
POTASSIUM SERPL-SCNC: 3.9 MMOL/L — SIGNIFICANT CHANGE UP (ref 3.5–5.3)
RBC # BLD: 4.32 M/UL — SIGNIFICANT CHANGE UP (ref 3.8–5.2)
RBC # FLD: 13.5 % — SIGNIFICANT CHANGE UP (ref 10.3–14.5)
RH IG SCN BLD-IMP: POSITIVE — SIGNIFICANT CHANGE UP
SODIUM SERPL-SCNC: 139 MMOL/L — SIGNIFICANT CHANGE UP (ref 135–145)
WBC # BLD: 6.07 K/UL — SIGNIFICANT CHANGE UP (ref 3.8–10.5)
WBC # FLD AUTO: 6.07 K/UL — SIGNIFICANT CHANGE UP (ref 3.8–10.5)

## 2022-04-08 PROCEDURE — 86901 BLOOD TYPING SEROLOGIC RH(D): CPT

## 2022-04-08 PROCEDURE — 80048 BASIC METABOLIC PNL TOTAL CA: CPT

## 2022-04-08 PROCEDURE — 86900 BLOOD TYPING SEROLOGIC ABO: CPT

## 2022-04-08 PROCEDURE — 86850 RBC ANTIBODY SCREEN: CPT

## 2022-04-08 PROCEDURE — G0463: CPT

## 2022-04-08 PROCEDURE — 85027 COMPLETE CBC AUTOMATED: CPT

## 2022-04-08 PROCEDURE — 83036 HEMOGLOBIN GLYCOSYLATED A1C: CPT

## 2022-04-08 RX ORDER — CEFOTETAN DISODIUM 1 G
2 VIAL (EA) INJECTION ONCE
Refills: 0 | Status: DISCONTINUED | OUTPATIENT
Start: 2022-04-29 | End: 2022-05-13

## 2022-04-08 NOTE — H&P PST ADULT - HISTORY OF PRESENT ILLNESS
46 yo female presents to PST     Covid19 vaccination series completed.  Covid19 12/2021 - fever x 1 day; syncopal episode; did not take confirmation test because "everyone in the house was sick". Denies symptoms of chest pain/palpitations. Does pilates x 2 a week and goes for 3 miles, 2x a week w/o dicomfort.    17 years ago, had "chest pain" and palpitations; went to hospital for observation for 24 hrs. Followed by cardiology x 1 year. Doesn't recall name of cardiologist. 46 yo female presents to PST prior to scheduled Laparoscopic total hysterectomy, laparoscopic right salpingo-oophorectomy, laparoscopic left salpingectomy on 4/29/2022 with Dr. Cresencio Reagan. Pmhx includes Covid19 infection in 12/2021; vaginal delivery (1999, 2004, 2005), c/o "chest pain and fast heart beat" with last pregnancy (2005; was followed by cardiology x 1 year; doesn't recall name of specialist, denies findings), herniated lumbar disc L4-L5, pancreatitis (age 18). Reports irregular, heavy periods x 5 years. Had imaging which revealed a "Cyst on the right ovary". Denies fevers, chills, chest pain, palpitations, SOB/BOONE, dizziness, syncope, dysuria, hematuria. Recently evaluated by Dr. Reagan and above surgery was recommended.      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 Did not take confirmation test because "everyone in the house was sick". Denies symptoms of chest pain/palpitations/SOB. Does pilates x 2 a week and goes for 3 miles, 2x a week w/o dicomfort.Discussed case w/ Dr. Hall; medical evaluation not necessary prior to surgery at this time.     Covid19 PCR at [facility] on 4/26/2022.  Covid19 vaccination series completed.

## 2022-04-08 NOTE — H&P PST ADULT - ACTIVITY
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

## 2022-04-08 NOTE — H&P PST ADULT - ATTENDING COMMENTS
pt with rt adnexal mass neg ova 1 and hx  of irreg bleeding for tlh rso left salpingectomy the risks and alternatives have been disucssed.

## 2022-04-08 NOTE — H&P PST ADULT - MUSCULOSKELETAL
Pediatric Bone Marrow Transplant Discharge Summary   [facility]     Admission Date: 10/10/19  Discharge Date: 10/12/19  Discharging Physician: Dr. 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  He is 100% donor engrafted with no signs of GVHD. 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      Antony continues on double antifungal coverage for his fusarium infection. Both Ambisome and Isavuconaole doses recently increased based on repeat chest CT 9/27/19 which revealed reduction in size of primary lesion, but new surrounding nodules, and increased ground glass appearance. Unclear if new nodules are new infection or existing infection now more visible with improved WBC.       Antony is admitted tonight for fever (temperature 100.8 in the outpatient setting). He has also had a 24 hour history of a new dry cough and drainage in his throat. He also reports feeling chills today as well as myalgias. He denies any increased work of breathing or shortness of breath. He did note that he had an increase in loose stools today, stooling approximately every hour. He and his mother reports that stools have varied greatly lately depending on what he has eaten (recently eating more and trying more foods). He continues to have nausea, much worsened during and after both antifungal infusions. Of note, TPN was discontinued yesterday and he was started on two 1L boluses containing potassium and magnesium. He continues on Zoloft therapy and started Wellbutrin yesterday. He has had no urinary changes or rash. No known sick contacts.     Past Medical History  Past Medical History:   Diagnosis Date     Fanconi's anemia (H)        Past Surgical History  Past  Surgical History:   Procedure Laterality Date     BONE MARROW BIOPSY       BONE MARROW BIOPSY, BONE SPECIMEN, NEEDLE/TROCAR Right 7/24/2018    Procedure: BIOPSY BONE MARROW;  Bone marrow biopsy;  Surgeon: Sharon Roman NP;  [facility]      BONE MARROW BIOPSY, BONE SPECIMEN, NEEDLE/TROCAR Right 6/4/2019    Procedure: BIOPSY, BONE MARROW;  Surgeon: Albaro Wilkins PA-C;  Location: [facility]      BONE MARROW BIOPSY, BONE SPECIMEN, NEEDLE/TROCAR Left 7/19/2019    Procedure: BIOPSY, BONE MARROW, suture removal on right calf;  Surgeon: Sharon Rooney NP;  Location: [facility]      BONE MARROW BIOPSY, BONE SPECIMEN, NEEDLE/TROCAR N/A 8/5/2019    Procedure: Bone marrow biopsy;  Surgeon: Sharon Rooeny NP;  Location: [facility]      BRONCHOSCOPY (RIGID OR FLEXIBLE), DIAGNOSTIC N/A 8/29/2019    Procedure: Flexible Bronchoscopy With Lavage;  Surgeon: Saud Loya MD;  [facility]     ESOPHAGOSCOPY, GASTROSCOPY, DUODENOSCOPY (EGD), COMBINED N/A 7/12/2019    Procedure: Upper endocopy with biopsy and Flexsigmoidoscopy with biopsy;  Surgeon: Yaritza Kwon MD;  [facility]: [facility]      INSERT CATHETER VASCULAR ACCESS N/A 6/4/2019    Procedure: INSERTION, VASCULAR ACCESS CATHETER;  Surgeon: Nicole Jones PA-C;  [facility]: [facility]      INSERT CATHETER VASCULAR ACCESS N/A 8/12/2019    Procedure: Non-tunneled fritz line placement;  Surgeon: Nicole Jones PA-C;  [facility]: [facility]      INSERT PICC LINE N/A 8/29/2019    Procedure: Picc Line Insertion;  Surgeon: Kimani Chávez PA-C;  [facility]     IR CVC TUNNEL PLACEMENT > 5 YRS OF AGE  6/4/2019     IR CVC TUNNEL PLACEMENT > 5 YRS OF AGE  8/12/2019     IR CVC TUNNEL REMOVAL RIGHT  8/9/2019     IR PICC PLACEMENT > 5 YRS OF AGE  8/29/2019     REMOVE CATHETER VASCULAR ACCESS N/A 8/9/2019    Procedure: Tunneled line removal;  Surgeon: Nicole Jones PA-C;  [facility]      SIGMOIDOSCOPY  details… FLEXIBLE N/A 7/12/2019    Procedure: Flexible sigmoidoscopy with biopsy;  Surgeon: Yaritza Kwon MD;  [facility]        Family History  Maternal grandfather (age 75) with melanoma on hand s/p XRT.  Paternal grandfather had NHL a few years ago. Recently develop a tumor on his back (mother unsure what type of cancer) s/p oral chemotherapy and is considered in remission. He did not undergo surgery for this tumor.      Social History  Jack and his mother have been staying at the [facility]. He lives in [place] with mother and father.  Father is a physical therapist and continues to work in [place]. He has two older sisters who are both in college.     Discharge Medications  Current Discharge Medication List      CONTINUE these medications which have CHANGED    Details   granisetron (KYTRIL) 1 MG tablet Take 1 tablet (1 mg) by mouth every 12 hours as needed for nausea  Qty: 30 tablet, Refills: 0    Associated Diagnoses: Stem cell transplant candidate         CONTINUE these medications which have NOT CHANGED    Details   acyclovir (ZOVIRAX) 800 MG tablet Take 1 tablet (800 mg) by mouth 5 times daily  Qty: 150 tablet, Refills: 3    Comments: Please deliver to [facility] today 10/3 ASAP  Associated Diagnoses: Fanconi's anemia (H)      amphotericin B LIPOSOME 250 mg Inject 400 mg into the vein every 24 hours     Comments: Supplied by [facility]      buPROPion (WELLBUTRIN XL) 150 MG 24 hr tablet Take 1 tablet (150 mg) by mouth every morning  Qty: 30 tablet, Refills: 1    Comments: please deliver to [facility]. Patient has an appt 10/09 AM  Associated Diagnoses: Major depressive disorder, single episode, moderate (H)      chlorhexidine (PERIDEX) 0.12 % solution Swish and spit 15 mLs in mouth 2 times daily  Qty: 473 mL, Refills: 0    Associated Diagnoses: Stem cell transplant candidate      diphenhydrAMINE (BENADRYL) 25 MG capsule Take 1 capsule (25 mg) by mouth every 24 hours  Qty: 30 capsule,  Refills: 3    Associated Diagnoses: Fanconi's anemia (H)      ISAVUCONAZONIUM SULFATE IV Inject 558 mg into the vein daily Provided by [facility]      levofloxacin (LEVAQUIN) 500 MG tablet Take 1 tablet (500 mg) by mouth daily  Qty: 30 tablet, Refills: 1    Associated Diagnoses: Fanconi's anemia (H)      LORazepam (ATIVAN) 0.5 MG tablet Take 1 tablet (0.5 mg) by mouth BID daily before medications.  Qty: 70 tablet, Refills: 0    Comments: Profile change only  Associated Diagnoses: Fanconi's anemia (H)      magic mouthwash suspension, diphenhydrAMINE, lidocaine, aluminum-magnesium & simethicone, (FIRST-MOUTHWASH BLM) compounding kit Swish and swallow 10 mLs in mouth every 6 hours as needed for mouth sores  Qty: 237 mL, Refills: 1    Associated Diagnoses: Fanconi's anemia (H)      melatonin 1 MG TABS tablet Take 5 tablets (5 mg) by mouth nightly as needed for sleep  Qty: 60 tablet, Refills: 1    Comments: Profile change only  Associated Diagnoses: Primary insomnia      pantoprazole (PROTONIX) 40 MG EC tablet Take 1 tablet (40 mg) by mouth 2 times daily  Qty: 60 tablet, Refills: 3    Associated Diagnoses: Fanconi's anemia (H)      sertraline (ZOLOFT) 100 MG tablet Take 2 tablets (200 mg) by mouth daily  Qty: 60 tablet, Refills: 3    Associated Diagnoses: Fanconi's anemia (H)      sodium chloride 0.9% infusion 1000 mL NS bolus with 25 mEq KCl + 1250 mg Mg Sulfate infused over 2.5 hours twice daily (at 0800 & 1400)  500 mL NS bolus infused over 2 hours (concurrently with isavuconazonium sulfate at 2000 - immediately prior to Ambisome)  Supplied by [facility]      tacrolimus (GENERIC EQUIVALENT) 0.5 MG capsule Total daily dose is 3mg BID. To have available for dose adjustments.  Qty: 60 capsule, Refills: 3    Comments: Please send to [facility].  Associated Diagnoses: Hx of stem cell transplant (H)      tacrolimus (GENERIC EQUIVALENT) 1 MG capsule Take 3 capsules (3 mg) by mouth 2 times daily  Qty: 180 capsule, Refills: 3     Comments: Please send to [facility]. To commence 10/2  Associated Diagnoses: Hx of stem cell transplant (H)      acetaminophen (TYLENOL) 325 MG tablet Take 2 tablets (650 mg) by mouth every 24 hours  Qty: 60 tablet, Refills: 3    Associated Diagnoses: Fanconi's anemia (H)         STOP taking these medications       azithromycin (ZITHROMAX) 250 MG tablet Comments:   Reason for Stopping:         potassium chloride ER (K-DUR/KLOR-CON M) 20 MEQ CR tablet Comments:   Reason for Stopping:         vitamin A & D (BABY) external ointment Comments:   Reason for Stopping:               Allergies      Allergies   Allergen Reactions     Morphine Nausea and Vomiting     Morphine Hcl Nausea and Vomiting     Seasonal Allergies        Discharge Physical Exam     GEN: Awake and alert but appears fatigued with flat affect  HEENT: Alopecia, Conjunctivae clear, nares patent. MMM, no discreet lesions, but unevenness on bilateral sides of tongue consistent with healing mucositis.   CARD: RRR, normal S1 and S2, no murmurs/rubs/gallops.  Cap refill 2 seconds  RESP: Lungs clear to auscultation bilaterally. No increased work of breathing, crackles or wheezes.   ABD:  Soft, non tender, no hepatosplenomegaly. Bowel sounds appreciated  EXTREM:  Warm well perfused, no edema noted.  SKIN: Maculopapular rash noted on arms bilaterally, wrists no longer affected. No rash noted on lower extremities.   ACCESS: DL CVC right chest, clean and dry without signs of infection.      Hospital Course   Antony was admitted for fever due to risk for serious bacterial infection. He also had a new history of cough and drainage in his throat. Blood cultures were obtained and he was started on empiric Cefepime.  Of note, Antony was not started on stress dose steroids during his hospitalization (no fevers during his hospitalization) and he remained clinically/hemodynamically stable. Respiratory viral panel and Influenza testing resulted negative. Blood cultures  remained no growth to date. Antony was transitioned to scheduled daily IV Magnesium Sulfate and potassium was added to his IV fluids along with usage of supplemental sliding scale for potassium replacements. Due to significant potassium replacement requirements, adjustments were made in IV fluids/electrolyte replacement orders upon discharge as below:     1. 1000 mL NS bolus with 25 mEq KCl and 1250 mg Mg Sulfate infused over 2.5 hours once daily at 10am  2. 500 mL NS bolus infused once daily at 4pm (concurrently with Cresemba, immediately prior to Ambisome)  3. 1000 mL NS infusion with 80 mEq KCl and 1250 mg Mg Sulfate infused over 10 hours from 8pm-6am    Repeat potassium level to be drawn by [facility] 0800 on 10/13  Antony had a new rash that waxed and waned throughout his hospitalization, continuing to monitor.   Tacrolimus dose increased 10/12, recheck level 10/15 with morning appointment.       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 Second alloHCT with 7/8 UCBT following FluATG on 8/19/19.   - Neutrophil recovery day +23. 100% myeloid and lymphoid donor engraftment as of 9/9.   - Defer day +21 bone marrow to day + due to fungal pneumonia. Subsequent evaluations at + 6 months, +1 year, and +2 years.      # Risk for GVHD: S/p MMF. No evidence of GVHD  - Changed to tacrolimus (from CSA) on 10/1, continue until 6 months post transplant: goal 5-10. Level 6.5 on 10/9.  Recheck level 10/12 was 4.3, dose increased to 3.5 mg BID.    - Repeat Tacrolimus level 10/15.     # Risk for aHUS/TA-TMA: No concern to date.   - LDH M/Th: 10/10: 111  - Urine protein/creat: 0.43 (10/1)      FEN:  # Risk for malnutrition: Eating more recently with weights stable in the outpatient  setting.   -TPN discontinued 10/9. Antony had been started on 1L boluses BID containing electrolytes (see below) at that time. Adjustments being made as below. Antony will still receive 2500 mL daily total in IV fluids (two 1000 mL boluses with electrolytes + 500 mL Ambisome flush)     # Acute Kidney Injury (amphotericin, CSA, other):  Creatinine recently increased, improved today to 0.83.  - On admission started on IV fluids 0.9 NaCl with 20 mEq/L at 100 mL/hr. For discharge, transitioning to the following boluses (also containing electrolytes)     1. 1000 mL NS bolus with 25 mEq KCl and 1250 mg Mg Sulfate infused over 2.5 hours once daily at 10am  2. 1000 mL NS infusion with 80 mEq KCl and 1250 mg Mg Sulfate infused over 10 hours from 8pm-6am    Note: In the outpatient setting prior to hospital admission, Antony had been started on two 1 L NS boluses on 10/9, each containing 25 meQ of KCL and 1.25 g of magnesium. In addition, was receiving 1000 mL NS bolus daily as well.     # Electrolyte disturbances: Hypokalemia and Hypomagnesemia persist, secondary to Ambisome  - Recheck Potassium 10/13 through [facility]   - Optimize electrolytes given shortened FL interval (K >3.4, Mg >2.0 (sliding scales in place), iCa> 4.5)    -Antony had transitioned off of TPN in the outpatient setting on 10/9 with the plan to start IV fluid boluses containing electrolytes. Unfortunately, he was admitted to the hospital the following day ( 10/10) so a period of evaluation on electrolyte management on bolus regimen was not possible. On admission, boluses were discontinued and was started on IV fluids containing Mg and K. Magnesium Sulfate was scheduled at 2 g daily. Potassium sliding scale replacement requirements were noted to be very high (particularly 10/11-10/12). Therefore, adjustments were made in IV fluid boluses containing electrolytes on discharge as follows:     Outpatient bolus orders:   1. 1000 mL NS bolus with 25 mEq KCl  and 1250 mg Mg Sulfate infused over 2.5 hours once daily at 10am  2. 1000 mL NS infusion with 80 mEq KCl and 1250 mg Mg Sulfate infused over 10 hours from 8pm-6am    - Recheck potassium with [facility] the morning of 10/13  -Of note, Oral Potassium placed on hold (not tolerating well prior to admission) and wanted to see his response to the following IV changes before restarting     # Fluid overload: s/p diuril and bumex. No signs of edema, weights had been stable in the outpatient setting.     Heme:   # Pancytopenia secondary to graft failure and chemotherapy:   - Transfuse for hgb <8.0 g/dL, and platelets <30k/uL given possible bleeding risk with fungal pneumonia  - Hemoglobin 11.7 on admission (presumed error), but recheck was 6.8. Post transfusion hgb of 9.4 g/dL and pre transfusion appropriately mildly elevated absolute reticulocyte count. Hemoglobin stable 10/12 at 9.3.  - Platelets 33,00 on 10/12, received platelets prior to discharge.  - Continue GCSF PRN for ANC <1000     # Coagulopathy:   - INR 1.19 10/7. Vitamin K was in TPN previously. Recheck INR 10/11 was mildly elevated at 1.28. Recheck 10/14 in clinic.  - Recheck INR at next clinic visit.     Cardiovascular:   # Risk for hypertension secondary to medications: Normotensive on admission.     # Shortened MT interval: Noted on pre-transplant workup EKG. Pediatric Cardiology consulted, follow clinically, obtain EKG/ECHO with any respiratory symptoms or dyspnea on extertion.  Continue with electrolyte management (goal K > 3.4, iCa > 4.5, mg > 2)  # Risk for long QTc:  Most recheck QTc (9/5) 433.   # ST and T-wave changes (per 8/30 EKG). Echo revealed good function and repeat EKG (9/5) showed resolved T wave inversion with inferior lead ST depression on 9/5. No more monitoring  Unless clinically indicated.     Respiratory:    # Pneumonia (see below): No difficulty breathing, no shortness of breath  Chest CT (9/10) stable. Repeat CT (9/27)  "reduction in size of larger nodule in CLEMENT. Multiple new adjacent smaller nodules in the left upper lobe, new interstitial thickening and groundglass opacity.  If more respiratory symptoms or persistent fevers consider repeat of chest ct.     # Cough/Rhinorrhea: Antony and his mother report a new cough, rhinorrhea with drainage down his throat which remained stable throughout this hospitalization. Influenza PCR was negative and full Respiratory Viral Panel negative     Infectious Disease:      #Fever: Temperature 100.8 in the outpatient setting, 100.3 on admission and no fevers during his hospitalization. Obtained blood cultures on admission and started empiric Cefepime. As below, stress dose steroids were not utilized during hospital admission.      # Risk for infection given immunocompromised status:   - Viral ppx (Sero CMV+/HSV +): Continue Acyclovir until day +100. Given prolonged neutropenia/2nd alloHCT status, monitor CMV, EBV, adeno PCRs neg 10/7.  - Restart Levofloxacin treatment dosing at discharge (held during inpatient hospitalization while on Cefepime).  - Fungal ppx: receiving treatment as above, discussed with peds ID (MD Adama) longer term plan/any benefit to \"double\" coverage versus bridging to therapeutic dosing of isavuconazole and he will reevaluate literature pertaining to this/outcomes with single versus double coverage and if in vitro susceptibilities can be informative on the matter (especially given significant electrolyte/IV needs with ambisome therapy)  - PCP ppx: INH Pentamidine given 9/20 due to neutropenia. Consider bactrim next month.       # Hypogammaglobulinemia: IgG (9/10) 574 without need for IVIG.   -  IgG on 9/22 was 879, replace if <400,  However IVIG can affect Fungitell lab interpretation.     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 Sinus CT negative, Brain MRI " negative. LP results negative. Ophtho exam with no evidence of fungal infection. ID following.   - Sensitive to both Isavuconazole and Ambisome. Repeat chest CT 9/27 with reduction in size of larger nodules, multiple new smaller nodules, interstitial thickening and ground glass opacities.   9/30- Consulted ID Dr. NIKITA Dinero-- continued double coverage. Changed Isavuconazole back to IV with recent norovirus+ to assure full absorption (now essentially asymptomatic). Level low on 9/22, dose increased. First IV dose 10/1, 10/7 level was within goal range (>3).  - 9/30 increased Ambisome dose per ID, first increased dose 10/1.   - 9/30 ordered 5 day course of Azithromycin, final dose 10/4. Levofloxacin treatment dosing held while Antony was on Cefepime, restarted at discharge.    # Norovirus: positive stool study 9/27. Diarrhea for 1 day on last week mid week. Stools formed again but some ongoing symptoms. No vomiting, no abdominal pain currently. Not tolerating Ju, so discontinued 10/1. Resolved or at least improved.     # Donor hep B surface antigen positive: no need to check as donor is STANTON negative     Past Infections:  - Staph epi bacteremia (from PICC 9/2) and Coag negative Staph (from BAL 8/29): Both resolved.  S. Epi grew from both lumens of PICC 9/2 with subsequent cultures negative. s/p vancomycin locks (completed 9/6) and completed course of linezolid 9/12.  - Staph epi bacteremia (8/6-8/9), CVC removed after failed EtOH locks, s/p vanc course  - PNA (fungal vs. Atypical on chest CT 7/5), s/p azithro course     GI:   # Gastritis:   - Continue Protonix BID     # Nausea: Stable to improved. Continue Kytril BID.     # Risk for VOD/hyperbilirubinemia: US abdomen 9/4 revealed antegrade flow on Doppler and no ascites. S/p SMOF lipids.          - Ursodiol discontinued on 9/22.     HEENT:  # History of gingival hypertrophy: Largely resolved since change from CSA to Tacrolimus.     Endo:  # Risk for iatrogenic  adrenal insufficiency: ACTH stim completed 9/14 with peak cortisol 11.7 (borderline), not currently on physiologic replacement for now  - Start stress dose steroids with fever or any clinical worsening (afebrile during hospital admission,  not ordered during hospitalization)    Derm:   # Rash: New rash noted (waxes and wanes) initially on wrists, now extending up arms. Continue to monitor closely and monitor for signs of GVHD.     Neuro/Psych:  # Mucositis /oral and anorectal pain: ENT re-consulted 9/10 and felt exam still consistent with mucositis rather than fungal involvement. Improving.      # Generalized body pain: Antony was noted to have intermittent episodes of body aches in the evening hours, PRN Oxycodone utilized   - Musculoskeletal aches: Upper back/neck, longstanding prior to BMT. Integrative therapy massages beneficial. Requested future appointments to coincide with [facility] appointments. Also essential oils prn.      # Bilateral retinal hemorrhages. Opthalmology revaluated Antony 9/17, no evidence of occular fungal infection. Worsening bilateral retinal hemorrhages noted, none involving central macula or impacting vision   - Serial dilated eye exams by Opthamology      # Insomia:   - Continue melatonin at bedtime. No longer taking zyprexa, effects too long lasting and he is still sleepy in the morning.      # Depression/mood disorder/anxiety: Antony saw psychiatry and started Welburtin 10/9.  - Zoloft 200 mg daily (inc 9/17)     # Access: Right DL CVC. Dressing c/d/i.       Disposition:  Will return to clinic 10/14 for provider appointment and labs       The above plan of care was developed by and communicated to me by the Pediatric BMT attending physician, Dr. Rito Day.    Adriana Franklin MD  Pediatric BMT Hospitalist     BMT Attending Note:     Antony was seen and evaluated by me today.      The significant interval history includes afebrile x 48hrs. No positive cultures. Doing well.     I have  reviewed changes and data from the last 24 hours, including K3.5.      I have formulated and discussed the plan with the BMT team. I discussed the course and plan with the patient/family and answered all of their questions to the best of my ability. I counseled them regarding the following:  Fanconi anemia s/p hematopoietic transplantation admitted for fever and risk of infection, hypokalemia, respiratory viral panel and Influenza testing resulted negative, blood cultures remained no growth to date.     My care coordination activities today include  scheduled daily IV Magnesium Sulfate and potassium was added to his IV fluids along with usage of supplemental sliding scale for potassium replacements. Hypokalemia monitoring and management and discharge instructions.     The decision was made that a discharge today would be appropriate.  The necessary coordination was done, and >30 minutes of time was required to accomplish this.  Follow-up has been arranged for the family as deemed appropriate.       Rito Day MD  Professor  Pediatric Blood and Marrow Transplant        No joint pain, swelling or deformity; no limitation of movement

## 2022-04-08 NOTE — H&P PST ADULT - PROBLEM SELECTOR PLAN 1
Laparoscopic total hysterectomy, laparoscopic right salpingo-oophorectomy, laparoscopic left salpingectomy on 4/29/2022 with Dr. Cresencio Reagan.   Pre-op instructions given. Questions answered.  Covid19 PCR on 4/26/22 at [facility].

## 2022-04-08 NOTE — H&P PST ADULT - NSICDXFAMILYHX_GEN_ALL_CORE_FT
FAMILY HISTORY:  Father  Still living? Yes, Estimated age: 61-70  Family history of hypertension, Age at diagnosis: Age Unknown    Child  Still living? Unknown  Family history of allergies, Age at diagnosis: Age Unknown    Grandparent  Still living? Unknown  FH: arrhythmia, Age at diagnosis: Age Unknown  FH: CHF (congestive heart failure), Age at diagnosis: Age Unknown

## 2022-04-08 NOTE — H&P PST ADULT - REASON FOR ADMISSION
"I am having a hysterectomy" "I have heavy periods and need to have a hysterectomy, right ovary removed, both tubes removed"

## 2022-04-08 NOTE — H&P PST ADULT - NSICDXPASTMEDICALHX_GEN_ALL_CORE_FT
PAST MEDICAL HISTORY:  2019 novel coronavirus disease (COVID-19)     COVID-19 vaccine series completed     Heavy menstrual bleeding     Herniated lumbar intervertebral disc L4-5    History of tachycardia during last pregnancy in 2005 - followed w/ cardiology x 1 year    Ovarian cyst right    Pancreatitis age 18    Vaginal delivery 9/1999, 1/2004, 3/2005

## 2022-04-20 PROBLEM — M51.26 OTHER INTERVERTEBRAL DISC DISPLACEMENT, LUMBAR REGION: Chronic | Status: ACTIVE | Noted: 2017-03-09

## 2022-04-20 PROBLEM — N83.209 UNSPECIFIED OVARIAN CYST, UNSPECIFIED SIDE: Chronic | Status: ACTIVE | Noted: 2022-04-08

## 2022-04-20 PROBLEM — K85.90 ACUTE PANCREATITIS WITHOUT NECROSIS OR INFECTION, UNSPECIFIED: Chronic | Status: ACTIVE | Noted: 2017-03-09

## 2022-04-20 PROBLEM — Z87.898 PERSONAL HISTORY OF OTHER SPECIFIED CONDITIONS: Chronic | Status: ACTIVE | Noted: 2022-04-08

## 2022-04-20 PROBLEM — Z92.29 PERSONAL HISTORY OF OTHER DRUG THERAPY: Chronic | Status: ACTIVE | Noted: 2022-04-08

## 2022-04-20 PROBLEM — U07.1 COVID-19: Chronic | Status: ACTIVE | Noted: 2022-04-08

## 2022-04-20 PROBLEM — N92.0 EXCESSIVE AND FREQUENT MENSTRUATION WITH REGULAR CYCLE: Chronic | Status: ACTIVE | Noted: 2022-04-08

## 2022-04-26 ENCOUNTER — OUTPATIENT (OUTPATIENT)
Dept: OUTPATIENT SERVICES | Facility: HOSPITAL | Age: 47
LOS: 1 days | End: 2022-04-26
Payer: COMMERCIAL

## 2022-04-26 DIAGNOSIS — Z90.89 ACQUIRED ABSENCE OF OTHER ORGANS: Chronic | ICD-10-CM

## 2022-04-26 DIAGNOSIS — Z87.39 PERSONAL HISTORY OF OTHER DISEASES OF THE MUSCULOSKELETAL SYSTEM AND CONNECTIVE TISSUE: Chronic | ICD-10-CM

## 2022-04-26 DIAGNOSIS — Z11.52 ENCOUNTER FOR SCREENING FOR COVID-19: ICD-10-CM

## 2022-04-26 LAB — SARS-COV-2 RNA SPEC QL NAA+PROBE: SIGNIFICANT CHANGE UP

## 2022-04-26 PROCEDURE — U0005: CPT

## 2022-04-26 PROCEDURE — C9803: CPT

## 2022-04-26 PROCEDURE — U0003: CPT

## 2022-04-28 ENCOUNTER — TRANSCRIPTION ENCOUNTER (OUTPATIENT)
Age: 47
End: 2022-04-28

## 2022-04-29 ENCOUNTER — APPOINTMENT (OUTPATIENT)
Dept: OBGYN | Facility: HOSPITAL | Age: 47
End: 2022-04-29

## 2022-04-29 ENCOUNTER — TRANSCRIPTION ENCOUNTER (OUTPATIENT)
Age: 47
End: 2022-04-29

## 2022-04-29 ENCOUNTER — RESULT REVIEW (OUTPATIENT)
Age: 47
End: 2022-04-29

## 2022-04-29 ENCOUNTER — OUTPATIENT (OUTPATIENT)
Dept: INPATIENT UNIT | Facility: HOSPITAL | Age: 47
LOS: 1 days | End: 2022-04-29
Payer: COMMERCIAL

## 2022-04-29 VITALS
HEIGHT: 62 IN | DIASTOLIC BLOOD PRESSURE: 85 MMHG | OXYGEN SATURATION: 98 % | TEMPERATURE: 98 F | SYSTOLIC BLOOD PRESSURE: 125 MMHG | WEIGHT: 139.99 LBS | HEART RATE: 95 BPM | RESPIRATION RATE: 18 BRPM

## 2022-04-29 VITALS
OXYGEN SATURATION: 100 % | TEMPERATURE: 98 F | DIASTOLIC BLOOD PRESSURE: 87 MMHG | HEART RATE: 86 BPM | RESPIRATION RATE: 16 BRPM | SYSTOLIC BLOOD PRESSURE: 147 MMHG

## 2022-04-29 DIAGNOSIS — R19.09 OTHER INTRA-ABDOMINAL AND PELVIC SWELLING, MASS AND LUMP: ICD-10-CM

## 2022-04-29 DIAGNOSIS — Z87.39 PERSONAL HISTORY OF OTHER DISEASES OF THE MUSCULOSKELETAL SYSTEM AND CONNECTIVE TISSUE: Chronic | ICD-10-CM

## 2022-04-29 DIAGNOSIS — Z90.89 ACQUIRED ABSENCE OF OTHER ORGANS: Chronic | ICD-10-CM

## 2022-04-29 DIAGNOSIS — N93.9 ABNORMAL UTERINE AND VAGINAL BLEEDING, UNSPECIFIED: ICD-10-CM

## 2022-04-29 DIAGNOSIS — Z90.710 ACQUIRED ABSENCE OF BOTH CERVIX AND UTERUS: ICD-10-CM

## 2022-04-29 LAB
GLUCOSE BLDC GLUCOMTR-MCNC: 75 MG/DL — SIGNIFICANT CHANGE UP (ref 70–99)
HCG UR QL: NEGATIVE — SIGNIFICANT CHANGE UP

## 2022-04-29 PROCEDURE — 88307 TISSUE EXAM BY PATHOLOGIST: CPT | Mod: 26

## 2022-04-29 PROCEDURE — 88112 CYTOPATH CELL ENHANCE TECH: CPT | Mod: 26

## 2022-04-29 PROCEDURE — 58662 LAPAROSCOPY EXCISE LESIONS: CPT

## 2022-04-29 PROCEDURE — C9399: CPT

## 2022-04-29 PROCEDURE — 81025 URINE PREGNANCY TEST: CPT

## 2022-04-29 PROCEDURE — 58571 TLH W/T/O 250 G OR LESS: CPT

## 2022-04-29 PROCEDURE — 88112 CYTOPATH CELL ENHANCE TECH: CPT

## 2022-04-29 PROCEDURE — C1889: CPT

## 2022-04-29 PROCEDURE — 88302 TISSUE EXAM BY PATHOLOGIST: CPT

## 2022-04-29 PROCEDURE — 88331 PATH CONSLTJ SURG 1 BLK 1SPC: CPT | Mod: 26

## 2022-04-29 PROCEDURE — 88305 TISSUE EXAM BY PATHOLOGIST: CPT

## 2022-04-29 PROCEDURE — 88305 TISSUE EXAM BY PATHOLOGIST: CPT | Mod: 26

## 2022-04-29 PROCEDURE — 88302 TISSUE EXAM BY PATHOLOGIST: CPT | Mod: 26

## 2022-04-29 PROCEDURE — 88307 TISSUE EXAM BY PATHOLOGIST: CPT

## 2022-04-29 PROCEDURE — 88331 PATH CONSLTJ SURG 1 BLK 1SPC: CPT

## 2022-04-29 PROCEDURE — 58571 TLH W/T/O 250 G OR LESS: CPT | Mod: 80

## 2022-04-29 PROCEDURE — 82962 GLUCOSE BLOOD TEST: CPT

## 2022-04-29 DEVICE — SURGICEL POWDER 3 GRAMS: Type: IMPLANTABLE DEVICE | Status: FUNCTIONAL

## 2022-04-29 RX ORDER — ACETAMINOPHEN 500 MG
1 TABLET ORAL
Qty: 28 | Refills: 0
Start: 2022-04-29 | End: 2022-05-05

## 2022-04-29 RX ORDER — OXYCODONE HYDROCHLORIDE 5 MG/1
1 TABLET ORAL
Qty: 6 | Refills: 0
Start: 2022-04-29

## 2022-04-29 RX ORDER — CHLORHEXIDINE GLUCONATE 213 G/1000ML
1 SOLUTION TOPICAL ONCE
Refills: 0 | Status: COMPLETED | OUTPATIENT
Start: 2022-04-29 | End: 2022-04-29

## 2022-04-29 RX ORDER — HYDROMORPHONE HYDROCHLORIDE 2 MG/ML
0.5 INJECTION INTRAMUSCULAR; INTRAVENOUS; SUBCUTANEOUS
Refills: 0 | Status: DISCONTINUED | OUTPATIENT
Start: 2022-04-29 | End: 2022-05-03

## 2022-04-29 RX ORDER — GABAPENTIN 400 MG/1
600 CAPSULE ORAL ONCE
Refills: 0 | Status: COMPLETED | OUTPATIENT
Start: 2022-04-29 | End: 2022-04-29

## 2022-04-29 RX ORDER — LIDOCAINE HCL 20 MG/ML
0.2 VIAL (ML) INJECTION ONCE
Refills: 0 | Status: COMPLETED | OUTPATIENT
Start: 2022-04-29 | End: 2022-04-29

## 2022-04-29 RX ORDER — SENNA PLUS 8.6 MG/1
1 TABLET ORAL
Qty: 14 | Refills: 0
Start: 2022-04-29 | End: 2022-05-05

## 2022-04-29 RX ORDER — OXYCODONE HYDROCHLORIDE 5 MG/1
5 TABLET ORAL ONCE
Refills: 0 | Status: DISCONTINUED | OUTPATIENT
Start: 2022-04-29 | End: 2022-04-29

## 2022-04-29 RX ORDER — MELOXICAM 15 MG/1
1 TABLET ORAL
Qty: 0 | Refills: 0 | DISCHARGE

## 2022-04-29 RX ORDER — APREPITANT 80 MG/1
40 CAPSULE ORAL ONCE
Refills: 0 | Status: COMPLETED | OUTPATIENT
Start: 2022-04-29 | End: 2022-04-29

## 2022-04-29 RX ORDER — SIMETHICONE 80 MG/1
1 TABLET, CHEWABLE ORAL
Qty: 14 | Refills: 0
Start: 2022-04-29 | End: 2022-05-05

## 2022-04-29 RX ORDER — ACETAMINOPHEN 500 MG
1000 TABLET ORAL ONCE
Refills: 0 | Status: COMPLETED | OUTPATIENT
Start: 2022-04-29 | End: 2022-04-29

## 2022-04-29 RX ORDER — IBUPROFEN 200 MG
1 TABLET ORAL
Qty: 28 | Refills: 0
Start: 2022-04-29 | End: 2022-05-05

## 2022-04-29 RX ORDER — SODIUM CHLORIDE 9 MG/ML
1000 INJECTION, SOLUTION INTRAVENOUS
Refills: 0 | Status: DISCONTINUED | OUTPATIENT
Start: 2022-04-29 | End: 2022-05-13

## 2022-04-29 RX ORDER — SODIUM CHLORIDE 9 MG/ML
3 INJECTION INTRAMUSCULAR; INTRAVENOUS; SUBCUTANEOUS EVERY 8 HOURS
Refills: 0 | Status: DISCONTINUED | OUTPATIENT
Start: 2022-04-29 | End: 2022-04-29

## 2022-04-29 RX ORDER — ONDANSETRON 8 MG/1
4 TABLET, FILM COATED ORAL ONCE
Refills: 0 | Status: COMPLETED | OUTPATIENT
Start: 2022-04-29 | End: 2022-04-29

## 2022-04-29 RX ORDER — CELECOXIB 200 MG/1
400 CAPSULE ORAL ONCE
Refills: 0 | Status: COMPLETED | OUTPATIENT
Start: 2022-04-29 | End: 2022-04-29

## 2022-04-29 RX ADMIN — GABAPENTIN 600 MILLIGRAM(S): 400 CAPSULE ORAL at 07:27

## 2022-04-29 RX ADMIN — CELECOXIB 400 MILLIGRAM(S): 200 CAPSULE ORAL at 07:27

## 2022-04-29 RX ADMIN — CHLORHEXIDINE GLUCONATE 1 APPLICATION(S): 213 SOLUTION TOPICAL at 07:26

## 2022-04-29 RX ADMIN — SODIUM CHLORIDE 3 MILLILITER(S): 9 INJECTION INTRAMUSCULAR; INTRAVENOUS; SUBCUTANEOUS at 05:48

## 2022-04-29 RX ADMIN — APREPITANT 40 MILLIGRAM(S): 80 CAPSULE ORAL at 07:27

## 2022-04-29 RX ADMIN — OXYCODONE HYDROCHLORIDE 5 MILLIGRAM(S): 5 TABLET ORAL at 15:55

## 2022-04-29 RX ADMIN — HYDROMORPHONE HYDROCHLORIDE 0.5 MILLIGRAM(S): 2 INJECTION INTRAMUSCULAR; INTRAVENOUS; SUBCUTANEOUS at 11:46

## 2022-04-29 RX ADMIN — HYDROMORPHONE HYDROCHLORIDE 0.5 MILLIGRAM(S): 2 INJECTION INTRAMUSCULAR; INTRAVENOUS; SUBCUTANEOUS at 11:45

## 2022-04-29 RX ADMIN — Medication 1000 MILLIGRAM(S): at 07:27

## 2022-04-29 RX ADMIN — ONDANSETRON 4 MILLIGRAM(S): 8 TABLET, FILM COATED ORAL at 11:25

## 2022-04-29 NOTE — PRE-ANESTHESIA EVALUATION ADULT - NSANTHPMHFT_GEN_ALL_CORE
COVID-19 12/2021, fever for 1 day and a syncopal episode, but never sought followup  Tachycardia during last pregnancy in 2005 - followed with cardiology, no intervention

## 2022-04-29 NOTE — ASU DISCHARGE PLAN (ADULT/PEDIATRIC) - CARE PROVIDER_API CALL
Cresencio Reagan)  Obstetrics and Gynecology  [address] [place] [address], [address]  [place] [address]  Phone: [phone]  Fax: [phone]  Follow Up Time:

## 2022-04-29 NOTE — ASU DISCHARGE PLAN (ADULT/PEDIATRIC) - NS MD DC FALL RISK RISK
For information on Fall & Injury Prevention, visit: [email or web address] OR  [email or web address] OR  https://www.cdc.gov/steadi/patient.html

## 2022-04-29 NOTE — PROGRESS NOTE ADULT - SUBJECTIVE AND OBJECTIVE BOX
PA POST-OP CHECK    S: Patient seen and evaluated at bedside. Pt sleeping, easily arousable.  Patient reports pain moderately controlled with analgesia. Pt denies N/V, SOB, CP, palpitations.   Not OOB yet.    O:   T(C): 36.2 (04-29-22 @ 13:00), Max: 36.2 (04-29-22 @ 13:00)  HR: 78 (04-29-22 @ 13:00) (72 - 82)  BP: 98/59 (04-29-22 @ 13:00) (90/50 - 101/60)  RR: 16 (04-29-22 @ 13:00) (16 - 17)  SpO2: 100% (04-29-22 @ 13:00) (91% - 100%)      Gen: Resting comfortably in bed, NAD  CV: S1S2, RRR  Lungs: CTA B/L  Abd: soft, appropriately tender, occassional BS x 4 quadrants.    Inc: Opsite x 4 clean/dry/intact w/ steri strips in place  Ext: SCD's in place and functional, non-tender b/l, no edema

## 2022-04-29 NOTE — BRIEF OPERATIVE NOTE - OPERATION/FINDINGS
EUA- small mobile bulky uterus, normal cervix and vagina, normal external female genitalia  Laparoscopic - bulky fibroid uterus 12cm, mobile, normal bilateral fallopian tubes, enlarge right ovary 8cm cyst simple appearing (removed intact, frozen benign), left ovary with 2 simple appearing 2cm cysts - simple appearing, bilateral ureters seen vermiculating EUA- small mobile bulky uterus, normal cervix and vagina, normal external female genitalia  Laparoscopic - bulky fibroid uterus 12cm, mobile, normal bilateral fallopian tubes, enlarge right ovary 8cm cyst simple appearing (removed intact, frozen benign cystadenoma), left ovary with 2 simple appearing 2cm cysts, bilateral ureters seen vermiculating, normal appearing liver edge and appendix, adhesion between sigmoid mesocolon and left uterosacral ligament

## 2022-04-29 NOTE — ASU DISCHARGE PLAN (ADULT/PEDIATRIC) - ASU DC SPECIAL INSTRUCTIONSFT
Discharge Instructions:  1. Diet: advance as tolerated  2. Activity limited by:   - no running, heavy lifting, strenuous exercise,   - nothing in vagina (bath, swim, intercourse, douching, tampons) for 6 weeks  3. Medications:   - Senna to prevent constipation (please hold for loose stools)  - Ibuprofen 600mg every 6 hours as needed for pain  - Acetaminophen 500mg every 4 hours as needed for pain  - Oxycodone 5mg every 4 hours for severe pain   4. Follow-up appointment -2 weeks. Please call the office upon discharge to schedule your appointment if you do not have one already.   5. Precautions:  - Call the office or go to the ED if you have any of the followin) Fever >100.4 that does not resolve  2) Intractable pain  3) Heavy bleeding

## 2022-04-29 NOTE — ASU PATIENT PROFILE, ADULT - FALL HARM RISK - UNIVERSAL INTERVENTIONS
Bed in lowest position, wheels locked, appropriate side rails in place/Call bell, personal items and telephone in reach/Instruct patient to call for assistance before getting out of bed or chair/Non-slip footwear when patient is out of bed/[place] to call system/Physically safe environment - no spills, clutter or unnecessary equipment/Purposeful Proactive Rounding/Room/bathroom lighting operational, light cord in reach

## 2022-04-29 NOTE — PRE-ANESTHESIA EVALUATION ADULT - NSRADCARDRESULTSFT_GEN_ALL_CORE
5/20/2017 TTE:   CONCLUSIONS:  1. Normal mitral valve. Mild mitral regurgitation.  2. Normal trileaflet aortic valve.  3. Normal left ventricular internal dimensions and wall  thicknesses.  4. Normal left ventricular systolic function. No segmental  wall motion abnormalities.  5. Normal right ventricular size and function.

## 2022-04-29 NOTE — ASU DISCHARGE PLAN (ADULT/PEDIATRIC) - PROCEDURE
Total Laparoscopic Hysterectomy, Right Salpingoopherectomy, Left Salpingectomy, Left Ovarian Cystectomy

## 2022-04-29 NOTE — BRIEF OPERATIVE NOTE - NSICDXBRIEFPOSTOP_GEN_ALL_CORE_FT
POST-OP DIAGNOSIS:  Right ovarian cyst 29-Apr-2022 11:59:30  Meryl Romero  Fibroid uterus 29-Apr-2022 11:59:36  Meryl Romero

## 2022-04-29 NOTE — PROGRESS NOTE ADULT - ASSESSMENT
A/P: 47y Female s/p Total Laparoscopic Hysterectomy Right Salpingo-Oophorectomy Left salpingectomy Left ovarian cystectomy, EBL 150ml

## 2022-04-29 NOTE — BRIEF OPERATIVE NOTE - NSICDXBRIEFPREOP_GEN_ALL_CORE_FT
PRE-OP DIAGNOSIS:  Right ovarian cyst 29-Apr-2022 11:58:59  Meryl Romero  Fibroid uterus 29-Apr-2022 11:59:11  Meryl Romero

## 2022-04-29 NOTE — BRIEF OPERATIVE NOTE - SPECIMENS
She is followed by cardiology. Rhythm controlled with sotalol. On coumadin for AC.    uterus, cervix, left fallopian tube, left ovarian cyst, right fallopian tube and ovary - benign

## 2022-04-29 NOTE — BRIEF OPERATIVE NOTE - NSICDXBRIEFPROCEDURE_GEN_ALL_CORE_FT
PROCEDURES:  Laparoscopic hysterectomy, total, uterus 250 g or less 29-Apr-2022 11:57:47  Meryl Romero  Salpingectomy, bilateral, total, laparoscopic 29-Apr-2022 11:58:04  Meryl Romero  Laparoscopic right oophorectomy for ovarian cyst 29-Apr-2022 11:58:13  Meryl Romero  Laparoscopic left ovarian cystectomy 29-Apr-2022 12:02:48  Meryl Romero

## 2022-04-29 NOTE — PROGRESS NOTE ADULT - PROBLEM SELECTOR PLAN 1
Neuro: PO Analgesia PRN    CV: Hemodynamically stable.  Monitor VS.   Pulm: Saturating well on room air.  Encourage OOB and incentive spirometer use.   GI: Advance to regular diet. Anti-emetics PRN.  : DTV by 7PM  FEN: Electrolytes: LR@125cc/hr.   Heme: DVT ppx w/ SCD's while in bed. Early ambulation, initially with assistance then as tolerated.   ID: Afebrile  Endo: No active issues   Dispo: Discharge from PACU to home when criteria met.

## 2022-05-02 LAB — NON-GYNECOLOGICAL CYTOLOGY STUDY: SIGNIFICANT CHANGE UP

## 2022-05-05 LAB — SURGICAL PATHOLOGY STUDY: SIGNIFICANT CHANGE UP

## 2022-05-12 ENCOUNTER — APPOINTMENT (OUTPATIENT)
Dept: OBGYN | Facility: CLINIC | Age: 47
End: 2022-05-12
Payer: COMMERCIAL

## 2022-05-12 VITALS
SYSTOLIC BLOOD PRESSURE: 112 MMHG | HEIGHT: 62 IN | WEIGHT: 142 LBS | DIASTOLIC BLOOD PRESSURE: 72 MMHG | BODY MASS INDEX: 26.13 KG/M2 | HEART RATE: 105 BPM

## 2022-05-12 PROCEDURE — 99024 POSTOP FOLLOW-UP VISIT: CPT

## 2022-05-25 NOTE — REASON FOR VISIT
[Post Op Day: ___] : Post-Op Day:  #[unfilled] [Procedure: ___] : Procedure: [unfilled] [de-identified] : s/p tlh rso left salpingectomy, left cystectomy

## 2022-06-02 ENCOUNTER — APPOINTMENT (OUTPATIENT)
Dept: OBGYN | Facility: CLINIC | Age: 47
End: 2022-06-02
Payer: COMMERCIAL

## 2022-06-02 VITALS
DIASTOLIC BLOOD PRESSURE: 77 MMHG | HEART RATE: 65 BPM | BODY MASS INDEX: 25.76 KG/M2 | WEIGHT: 140 LBS | HEIGHT: 62 IN | SYSTOLIC BLOOD PRESSURE: 116 MMHG

## 2022-06-02 PROCEDURE — 99024 POSTOP FOLLOW-UP VISIT: CPT

## 2022-06-03 NOTE — HISTORY OF PRESENT ILLNESS
[Pain is well-controlled] : pain is well-controlled [Fever] : no fever [Chills] : no chills [Nausea] : no nausea [Vomiting] : no vomiting [Clean/Dry/Intact] : clean, dry and intact [Erythema] : not erythematous [None] : no vaginal bleeding [Normal] : normal [Pathology reviewed] : pathology reviewed [de-identified] : doing well no complaints [de-identified] : cuff intact healing well

## 2022-07-27 NOTE — ED PROVIDER NOTE - MEDICAL DECISION MAKING DETAILS
Sampleof ozempic 2mg given to pt and he is to take it every wed. Watch low carb diet, exercise.    Azalia att: 43 yo woman presenting with isolated fever and tachycardia, despite fluid rescus.  No e/o encephalitis, bacterial meningitis, pneumonia, soft tissue infection, joint infection, meningococcemia, intraabdominal infection such as appendicitis, cholecystitis, no risk factors or findings concerning for endocarditis, no e/o lyme disease, no e/o RMSF.  Patient had negative rapid flu and strep at urgent care.  TSH and RVP added.  CDU for telemetry and serial exam.  Ceftriaxone given, however no urinary symptoms, UA minimally positive.

## 2022-08-01 ENCOUNTER — APPOINTMENT (OUTPATIENT)
Dept: SURGICAL ONCOLOGY | Facility: CLINIC | Age: 47
End: 2022-08-01

## 2022-11-15 NOTE — ED CDU PROVIDER NOTE - CPE EDP GASTRO NORM
Medications sent to pharmacy  Try to wear light breathable clothing and tried to stay in air-conditioned environment  If you do sweat recommend changing of your close quickly  Antibacterial soap in the shower  Findings clinic with any concerns  Folliculitis  
normal...

## 2023-08-01 NOTE — ED CDU PROVIDER NOTE - SKIN NEGATIVE STATEMENT, MLM
no abrasions, no jaundice, no lesions, no pruritis, and no rashes. Complex Repair And Single Advancement Flap Text: The defect edges were debeveled with a #15 scalpel blade.  The primary defect was closed partially with a complex linear closure.  Given the location of the remaining defect, shape of the defect and the proximity to free margins a single advancement flap was deemed most appropriate for complete closure of the defect.  Using a sterile surgical marker, an appropriate advancement flap was drawn incorporating the defect and placing the expected incisions within the relaxed skin tension lines where possible. The area thus outlined was incised deep to adipose tissue with a #15 scalpel blade. The skin margins were undermined to an appropriate distance in all directions utilizing iris scissors and carried over to close the primary defect.

## 2023-08-30 ENCOUNTER — APPOINTMENT (OUTPATIENT)
Dept: ENDOCRINOLOGY | Facility: CLINIC | Age: 48
End: 2023-08-30
Payer: COMMERCIAL

## 2023-08-30 VITALS
TEMPERATURE: 98.2 F | SYSTOLIC BLOOD PRESSURE: 122 MMHG | HEIGHT: 62 IN | HEART RATE: 85 BPM | BODY MASS INDEX: 28.34 KG/M2 | OXYGEN SATURATION: 98 % | WEIGHT: 154 LBS | DIASTOLIC BLOOD PRESSURE: 82 MMHG

## 2023-08-30 DIAGNOSIS — N83.209 UNSPECIFIED OVARIAN CYST, UNSPECIFIED SIDE: ICD-10-CM

## 2023-08-30 DIAGNOSIS — Z78.9 OTHER SPECIFIED HEALTH STATUS: ICD-10-CM

## 2023-08-30 PROCEDURE — 99204 OFFICE O/P NEW MOD 45 MIN: CPT

## 2023-08-31 LAB
25(OH)D3 SERPL-MCNC: 45.6 NG/ML
ALBUMIN SERPL ELPH-MCNC: 4.4 G/DL
ALP BLD-CCNC: 79 U/L
ALT SERPL-CCNC: 15 U/L
ANION GAP SERPL CALC-SCNC: 16 MMOL/L
AST SERPL-CCNC: 16 U/L
BILIRUB SERPL-MCNC: 0.4 MG/DL
BUN SERPL-MCNC: 13 MG/DL
CALCIUM SERPL-MCNC: 9 MG/DL
CHLORIDE SERPL-SCNC: 102 MMOL/L
CO2 SERPL-SCNC: 24 MMOL/L
CREAT SERPL-MCNC: 0.68 MG/DL
EGFR: 107 ML/MIN/1.73M2
ESTIMATED AVERAGE GLUCOSE: 97 MG/DL
ESTRADIOL SERPL-MCNC: 361 PG/ML
FOLATE SERPL-MCNC: 9.9 NG/ML
FSH SERPL-MCNC: 15.2 IU/L
GLUCOSE SERPL-MCNC: 74 MG/DL
HBA1C MFR BLD HPLC: 5 %
LH SERPL-ACNC: 16.6 IU/L
POTASSIUM SERPL-SCNC: 4.1 MMOL/L
PROT SERPL-MCNC: 7.4 G/DL
SODIUM SERPL-SCNC: 143 MMOL/L
T3FREE SERPL-MCNC: 2.74 PG/ML
T4 FREE SERPL-MCNC: 1.1 NG/DL
TSH SERPL-ACNC: 2.19 UIU/ML
VIT B12 SERPL-MCNC: >2000 PG/ML

## 2023-09-04 PROBLEM — Z78.9 NON-SMOKER: Status: ACTIVE | Noted: 2023-09-04

## 2023-09-04 PROBLEM — N83.209 OVARIAN CYST: Status: ACTIVE | Noted: 2022-03-03

## 2023-09-04 PROBLEM — Z78.9 RARELY CONSUMES ALCOHOL: Status: ACTIVE | Noted: 2023-09-04

## 2023-09-04 NOTE — HISTORY OF PRESENT ILLNESS
[FreeTextEntry1] : Ms. DEGROOT is a old female who presents for initial endocrine evaluation. She presents with regard to a history of weight gain.    Additional medical history includes that of hx of hysterectomy and bilateral salpingectomy April 2022 in light of enlarging ovarian cyst with Dr. Reagan [facility]. She does still have left ovary in place. She previously had irregular menses with heavy flow. GYN assessment did not feel the irregular menses was c/w ovarian cyst. She did try OCP, but stope due to intolerance.  She did follow up with GYN, Dr. Melissa Oppenheim, and was started on estrogen path for last 3 months. She did have rapid weight gain and hot flashes. Hot flashes improved with estrogen patch.   presurgical weight: 140 lbs In October she was down to 132 lbs Current weight 152 lbs.   She has tried Provitalize for 3 months with no improvement. She is drinking collagen detox tea.  She is walking for 4 days a week , 2.5-3 miles per day . Is planning to rejoin gym  Breakfast: cottage cheese, yogurt and granola, protein bar, oatmeal -- will try to wait to eat until 10/11 am Lunch: cheese and crackers, hummus and crackers, yogurt dinner: pasta, steak, chiken, salads  During the school  year, does not eat out. When she does eat out she will have small portions She gets full very fast. Socially drinks.  Has not met with RD.   AT 18 years old she had viral pancreatitis, no further episodes since.  No additional medical history.  Is taking b12 daily and vitamin D3 daily 2000 IU.   No DEXA as of yet.   Family Hx: Father has HTN/preDM- but now it is stable with diet/exercise. Mother has HLD. Maternal grandparents had Dm2.  Sister has hx of gestational dm.   Social Hx: Denies smoking drug use

## 2023-09-05 ENCOUNTER — APPOINTMENT (OUTPATIENT)
Dept: ENDOCRINOLOGY | Facility: CLINIC | Age: 48
End: 2023-09-05

## 2023-10-10 RX ORDER — SEMAGLUTIDE 0.25 MG/.5ML
0.25 INJECTION, SOLUTION SUBCUTANEOUS
Qty: 1 | Refills: 1 | Status: DISCONTINUED | COMMUNITY
Start: 2023-08-30 | End: 2023-10-10

## 2023-10-13 RX ORDER — LIRAGLUTIDE 6 MG/ML
18 INJECTION, SOLUTION SUBCUTANEOUS
Qty: 3 | Refills: 1 | Status: DISCONTINUED | COMMUNITY
Start: 2023-10-06 | End: 2023-10-13

## 2024-01-08 ENCOUNTER — APPOINTMENT (OUTPATIENT)
Dept: ENDOCRINOLOGY | Facility: CLINIC | Age: 49
End: 2024-01-08
Payer: COMMERCIAL

## 2024-01-08 VITALS
HEART RATE: 94 BPM | WEIGHT: 150.5 LBS | OXYGEN SATURATION: 97 % | TEMPERATURE: 98.2 F | DIASTOLIC BLOOD PRESSURE: 68 MMHG | BODY MASS INDEX: 27.53 KG/M2 | SYSTOLIC BLOOD PRESSURE: 112 MMHG

## 2024-01-08 DIAGNOSIS — R63.5 ABNORMAL WEIGHT GAIN: ICD-10-CM

## 2024-01-08 DIAGNOSIS — E55.9 VITAMIN D DEFICIENCY, UNSPECIFIED: ICD-10-CM

## 2024-01-08 DIAGNOSIS — E53.8 DEFICIENCY OF OTHER SPECIFIED B GROUP VITAMINS: ICD-10-CM

## 2024-01-08 PROCEDURE — 99214 OFFICE O/P EST MOD 30 MIN: CPT | Mod: 25

## 2024-01-08 PROCEDURE — 36415 COLL VENOUS BLD VENIPUNCTURE: CPT

## 2024-01-08 NOTE — HISTORY OF PRESENT ILLNESS
[FreeTextEntry1] : Ms. DEGROOT is a 47 yearold female returns with regard to a history of weight gain.  Additional medical history includes that of hx of hysterectomy and bilateral salpingectomy April 2022 in light of enlarging ovarian cyst with Dr. Reagan [facility]. She does still have left ovary in place.   She previously had irregular menses with heavy flow. GYN assessment did not feel the irregular menses was c/w ovarian cyst. She did try OCP, but stopped due to intolerance. She did follow up with GYN, Dr. Melissa Oppenheim, and was started on estrogen patch for last 3 months. She did have rapid weight gain  Hot flashes improved with estrogen patch.  presurgical weight: 140 lbs In October 2022 she was down to 132 lbs Current weight 150 lbs.   She now returns on phentermine 15 mg daily , tolerating well. Denies palpitations, tremors, nasuea. She does note mild appetite suppression however has not seen significant weight loss.   She reports prior wegovy was not covered on her old insurance, insurance did change as of late.   She is drinking collagen detox tea. She is walking for 4 days a week , 2.5-3 miles per day . She has not been working out for last 3-4 weeks   Breakfast: cottage cheese, yogurt and granola, protein bar, oatmeal -- will try to wait to eat until 10/11 am Lunch: cheese and crackers, hummus and crackers, yogurt dinner: pasta, steak, chiken, salads During the school year, does not eat out. When she does eat out she will have small portions She gets full very fast. Socially drinks. Has not met with RD.  AT 18 years old she had viral pancreatitis, no further episodes since.  No additional medical history. takes b12 3-4x per week and vitamin D3 daily 5000 IU.

## 2024-01-08 NOTE — ADDENDUM
[FreeTextEntry1] :  blood will be drawn in the office today Today's evaluation reviewed with Dr. Toscano along with any changes and plan of care.

## 2024-01-09 LAB
25(OH)D3 SERPL-MCNC: 36.1 NG/ML
ALBUMIN SERPL ELPH-MCNC: 4.3 G/DL
ALP BLD-CCNC: 74 U/L
ALT SERPL-CCNC: 12 U/L
ANION GAP SERPL CALC-SCNC: 10 MMOL/L
AST SERPL-CCNC: 17 U/L
BILIRUB SERPL-MCNC: 0.5 MG/DL
BUN SERPL-MCNC: 8 MG/DL
CALCIUM SERPL-MCNC: 9.2 MG/DL
CHLORIDE SERPL-SCNC: 103 MMOL/L
CO2 SERPL-SCNC: 28 MMOL/L
CREAT SERPL-MCNC: 0.66 MG/DL
EGFR: 107 ML/MIN/1.73M2
ESTIMATED AVERAGE GLUCOSE: 97 MG/DL
FOLATE SERPL-MCNC: 12.4 NG/ML
GLUCOSE SERPL-MCNC: 80 MG/DL
HBA1C MFR BLD HPLC: 5 %
POTASSIUM SERPL-SCNC: 3.7 MMOL/L
PROT SERPL-MCNC: 7.7 G/DL
SODIUM SERPL-SCNC: 140 MMOL/L
T3FREE SERPL-MCNC: 3.13 PG/ML
T4 FREE SERPL-MCNC: 1.3 NG/DL
TSH SERPL-ACNC: 2.18 UIU/ML
VIT B12 SERPL-MCNC: 1272 PG/ML

## 2024-01-23 ENCOUNTER — NON-APPOINTMENT (OUTPATIENT)
Age: 49
End: 2024-01-23

## 2024-01-23 RX ORDER — TIRZEPATIDE 2.5 MG/.5ML
2.5 INJECTION, SOLUTION SUBCUTANEOUS
Qty: 3 | Refills: 1 | Status: DISCONTINUED | COMMUNITY
Start: 2024-01-08 | End: 2024-01-23

## 2024-01-23 RX ORDER — PHENTERMINE HYDROCHLORIDE 15 MG/1
15 CAPSULE ORAL
Qty: 60 | Refills: 0 | Status: DISCONTINUED | COMMUNITY
Start: 2023-10-13 | End: 2024-01-23

## 2024-02-02 RX ORDER — PHENTERMINE AND TOPIRAMATE 7.5; 46 MG/1; MG/1
7.5-46 CAPSULE, EXTENDED RELEASE ORAL
Qty: 30 | Refills: 0 | Status: DISCONTINUED | COMMUNITY
Start: 2024-01-23 | End: 2024-02-02

## 2024-02-05 ENCOUNTER — NON-APPOINTMENT (OUTPATIENT)
Age: 49
End: 2024-02-05

## 2024-02-07 ENCOUNTER — NON-APPOINTMENT (OUTPATIENT)
Age: 49
End: 2024-02-07

## 2024-02-07 RX ORDER — TIRZEPATIDE 2.5 MG/.5ML
2.5 INJECTION, SOLUTION SUBCUTANEOUS
Qty: 3 | Refills: 1 | Status: DISCONTINUED | COMMUNITY
Start: 2024-02-02 | End: 2024-02-07

## 2024-02-15 ENCOUNTER — NON-APPOINTMENT (OUTPATIENT)
Age: 49
End: 2024-02-15

## 2024-03-20 RX ORDER — PHENTERMINE HYDROCHLORIDE 15 MG/1
15 CAPSULE ORAL
Qty: 55 | Refills: 0 | Status: ACTIVE | COMMUNITY
Start: 2024-02-07 | End: 1900-01-01

## 2024-05-20 ENCOUNTER — APPOINTMENT (OUTPATIENT)
Dept: ENDOCRINOLOGY | Facility: CLINIC | Age: 49
End: 2024-05-20

## 2024-10-08 ENCOUNTER — APPOINTMENT (OUTPATIENT)
Dept: ENDOCRINOLOGY | Facility: CLINIC | Age: 49
End: 2024-10-08

## 2024-10-08 VITALS
OXYGEN SATURATION: 98 % | HEART RATE: 92 BPM | HEIGHT: 62 IN | DIASTOLIC BLOOD PRESSURE: 78 MMHG | WEIGHT: 149.13 LBS | SYSTOLIC BLOOD PRESSURE: 118 MMHG | BODY MASS INDEX: 27.44 KG/M2

## 2024-10-08 DIAGNOSIS — E53.8 DEFICIENCY OF OTHER SPECIFIED B GROUP VITAMINS: ICD-10-CM

## 2024-10-08 DIAGNOSIS — N83.209 UNSPECIFIED OVARIAN CYST, UNSPECIFIED SIDE: ICD-10-CM

## 2024-10-08 DIAGNOSIS — E55.9 VITAMIN D DEFICIENCY, UNSPECIFIED: ICD-10-CM

## 2024-10-08 PROCEDURE — 36415 COLL VENOUS BLD VENIPUNCTURE: CPT

## 2024-10-08 PROCEDURE — 99214 OFFICE O/P EST MOD 30 MIN: CPT

## 2024-10-09 DIAGNOSIS — R63.5 ABNORMAL WEIGHT GAIN: ICD-10-CM

## 2024-10-09 LAB
25(OH)D3 SERPL-MCNC: 33.9 NG/ML
ALBUMIN SERPL ELPH-MCNC: 4.4 G/DL
ALP BLD-CCNC: 75 U/L
ALT SERPL-CCNC: 10 U/L
ANION GAP SERPL CALC-SCNC: 15 MMOL/L
APO B SERPL-MCNC: 83 MG/DL
AST SERPL-CCNC: 15 U/L
BASOPHILS # BLD AUTO: 0.05 K/UL
BASOPHILS NFR BLD AUTO: 0.6 %
BILIRUB SERPL-MCNC: 0.5 MG/DL
BUN SERPL-MCNC: 10 MG/DL
CALCIUM SERPL-MCNC: 9.8 MG/DL
CHLORIDE SERPL-SCNC: 102 MMOL/L
CHOLEST SERPL-MCNC: 209 MG/DL
CO2 SERPL-SCNC: 23 MMOL/L
CREAT SERPL-MCNC: 0.77 MG/DL
DHEA-S SERPL-MCNC: 157 UG/DL
EGFR: 94 ML/MIN/1.73M2
EOSINOPHIL # BLD AUTO: 0.16 K/UL
EOSINOPHIL NFR BLD AUTO: 1.9 %
ESTRADIOL SERPL-MCNC: 48 PG/ML
FERRITIN SERPL-MCNC: 48 NG/ML
FSH SERPL-MCNC: 74.1 IU/L
GLUCOSE SERPL-MCNC: 77 MG/DL
HCT VFR BLD CALC: 45.3 %
HDLC SERPL-MCNC: 78 MG/DL
HGB BLD-MCNC: 14.4 G/DL
IMM GRANULOCYTES NFR BLD AUTO: 0.3 %
IRON SERPL-MCNC: 105 UG/DL
LDLC SERPL DIRECT ASSAY-MCNC: 107 MG/DL
LH SERPL-ACNC: 53.2 IU/L
LYMPHOCYTES # BLD AUTO: 2.05 K/UL
LYMPHOCYTES NFR BLD AUTO: 23.9 %
MAGNESIUM SERPL-MCNC: 2.1 MG/DL
MAN DIFF?: NORMAL
MCHC RBC-ENTMCNC: 31.3 PG
MCHC RBC-ENTMCNC: 31.8 GM/DL
MCV RBC AUTO: 98.5 FL
MONOCYTES # BLD AUTO: 0.44 K/UL
MONOCYTES NFR BLD AUTO: 5.1 %
NEUTROPHILS # BLD AUTO: 5.86 K/UL
NEUTROPHILS NFR BLD AUTO: 68.2 %
PLATELET # BLD AUTO: 300 K/UL
POTASSIUM SERPL-SCNC: 4.6 MMOL/L
PROT SERPL-MCNC: 7.4 G/DL
RBC # BLD: 4.6 M/UL
RBC # FLD: 13.2 %
SODIUM SERPL-SCNC: 140 MMOL/L
T3FREE SERPL-MCNC: 2.78 PG/ML
T4 FREE SERPL-MCNC: 1.2 NG/DL
TRIGL SERPL-MCNC: 127 MG/DL
TSH SERPL-ACNC: 2.21 UIU/ML
VIT B12 SERPL-MCNC: 586 PG/ML
WBC # FLD AUTO: 8.59 K/UL

## 2024-10-10 LAB
TESTOST FREE SERPL-MCNC: 1.9 PG/ML
TESTOST SERPL-MCNC: 30.6 NG/DL

## 2024-10-18 RX ORDER — TIRZEPATIDE 2.5 MG/.5ML
2.5 INJECTION, SOLUTION SUBCUTANEOUS
Qty: 3 | Refills: 1 | Status: ACTIVE | COMMUNITY
Start: 2024-10-09 | End: 1900-01-01

## 2024-11-04 NOTE — ASU PREOP CHECKLIST - IV STARTED
yes

## 2025-03-18 ENCOUNTER — APPOINTMENT (OUTPATIENT)
Dept: ENDOCRINOLOGY | Facility: CLINIC | Age: 50
End: 2025-03-18
Payer: COMMERCIAL

## 2025-03-18 VITALS
SYSTOLIC BLOOD PRESSURE: 118 MMHG | OXYGEN SATURATION: 99 % | WEIGHT: 134.13 LBS | HEIGHT: 62 IN | BODY MASS INDEX: 24.68 KG/M2 | TEMPERATURE: 98 F | HEART RATE: 105 BPM | DIASTOLIC BLOOD PRESSURE: 60 MMHG

## 2025-03-18 DIAGNOSIS — E55.9 VITAMIN D DEFICIENCY, UNSPECIFIED: ICD-10-CM

## 2025-03-18 DIAGNOSIS — N83.209 UNSPECIFIED OVARIAN CYST, UNSPECIFIED SIDE: ICD-10-CM

## 2025-03-18 DIAGNOSIS — E53.8 DEFICIENCY OF OTHER SPECIFIED B GROUP VITAMINS: ICD-10-CM

## 2025-03-18 DIAGNOSIS — R63.5 ABNORMAL WEIGHT GAIN: ICD-10-CM

## 2025-03-18 PROCEDURE — 99214 OFFICE O/P EST MOD 30 MIN: CPT

## 2025-03-18 PROCEDURE — G2211 COMPLEX E/M VISIT ADD ON: CPT | Mod: NC

## 2025-04-01 RX ORDER — PHENTERMINE HYDROCHLORIDE 30 MG/1
30 CAPSULE ORAL
Qty: 30 | Refills: 0 | Status: ACTIVE | COMMUNITY
Start: 2025-04-01 | End: 1900-01-01

## 2025-08-25 ENCOUNTER — NON-APPOINTMENT (OUTPATIENT)
Age: 50
End: 2025-08-25

## 2025-08-27 ENCOUNTER — APPOINTMENT (OUTPATIENT)
Dept: ENDOCRINOLOGY | Facility: CLINIC | Age: 50
End: 2025-08-27
Payer: COMMERCIAL

## 2025-08-27 ENCOUNTER — NON-APPOINTMENT (OUTPATIENT)
Age: 50
End: 2025-08-27

## 2025-08-27 VITALS
BODY MASS INDEX: 23.04 KG/M2 | HEIGHT: 62 IN | SYSTOLIC BLOOD PRESSURE: 122 MMHG | OXYGEN SATURATION: 99 % | WEIGHT: 125.19 LBS | TEMPERATURE: 97 F | HEART RATE: 95 BPM | DIASTOLIC BLOOD PRESSURE: 85 MMHG

## 2025-08-27 VITALS — DIASTOLIC BLOOD PRESSURE: 80 MMHG | SYSTOLIC BLOOD PRESSURE: 122 MMHG

## 2025-08-27 DIAGNOSIS — E53.8 DEFICIENCY OF OTHER SPECIFIED B GROUP VITAMINS: ICD-10-CM

## 2025-08-27 DIAGNOSIS — E55.9 VITAMIN D DEFICIENCY, UNSPECIFIED: ICD-10-CM

## 2025-08-27 DIAGNOSIS — N83.209 UNSPECIFIED OVARIAN CYST, UNSPECIFIED SIDE: ICD-10-CM

## 2025-08-27 DIAGNOSIS — R63.5 ABNORMAL WEIGHT GAIN: ICD-10-CM

## 2025-08-27 PROCEDURE — G2211 COMPLEX E/M VISIT ADD ON: CPT | Mod: NC

## 2025-08-27 PROCEDURE — 99214 OFFICE O/P EST MOD 30 MIN: CPT

## (undated) DEVICE — PACK GYN LAPAROSCOPY

## (undated) DEVICE — SYR LUER LOK 10CC

## (undated) DEVICE — SUT VLOC 180 0 18" GS-21 GREEN

## (undated) DEVICE — TROCAR COVIDIEN VERSASTEP PLUS 11MM STANDARD

## (undated) DEVICE — GOWN TRIMAX LG

## (undated) DEVICE — DRAPE TOWEL BLUE 17" X 24"

## (undated) DEVICE — TROCAR COVIDIEN VERSASTEP PLUS 15MM STANDARD

## (undated) DEVICE — UTERINE MANIPULATOR COOPER SURGICAL 5MM 33CM GREEN

## (undated) DEVICE — UTERINE MANIPULATOR CONMED VCARE MED 34MM

## (undated) DEVICE — TUBING STRYKEFLOW II SUCTION / IRRIGATOR

## (undated) DEVICE — OS FINDER

## (undated) DEVICE — POSITIONER FOAM EGG CRATE ULNAR 2PCS (PINK)

## (undated) DEVICE — LIGASURE BLUNT TIP 37CM

## (undated) DEVICE — PREP CHLORAPREP HI-LITE ORANGE 26ML

## (undated) DEVICE — MEDICATION LABELS W MARKER

## (undated) DEVICE — UTERINE MANIPULATOR CLINICAL INNOVATIONS CLEARVIEW 7CM

## (undated) DEVICE — RUMI TIP BLUE 6.7MM X 8CM

## (undated) DEVICE — BLADE SCALPEL SAFETYLOCK #10

## (undated) DEVICE — TUBING TUR 2 PRONG

## (undated) DEVICE — UTERINE MANIPULATOR CONMED VCARE LG 37MM

## (undated) DEVICE — ENDOCATCH 10MM SPECIMEN POUCH

## (undated) DEVICE — FOLEY TRAY 16FR LF URINE METER SURESTEP

## (undated) DEVICE — APPLICATOR SURGICEL LAP TROCAR POINT 2.5MM X 150MM

## (undated) DEVICE — DRAPE MAYO STAND 30"

## (undated) DEVICE — TROCAR APPLIED MEDICAL KII BALLOON BLUNT TIP 12MM X 100MM

## (undated) DEVICE — OLYMPUS PK SPATULA 5MM

## (undated) DEVICE — VALVE YELLOW PORT SEAL PLUS 5MM

## (undated) DEVICE — VENODYNE/SCD SLEEVE CALF LARGE

## (undated) DEVICE — SPECIMEN CONTAINER 100ML

## (undated) DEVICE — DRAPE INSTRUMENT POUCH 6.75" X 11"

## (undated) DEVICE — BLADE SCALPEL SAFETYLOCK #15

## (undated) DEVICE — ELCTR BOVIE PENCIL SMOKE EVACUATION

## (undated) DEVICE — INSUFFLATION NDL COVIDIEN STEP 14G FOR STEP/VERSASTEP

## (undated) DEVICE — MARKING PEN W RULER

## (undated) DEVICE — TUBING INSUFFLATION LAP FILTER 10FT

## (undated) DEVICE — PREP BETADINE SPONGE STICKS

## (undated) DEVICE — TROCAR COVIDIEN VERSASTEP PLUS 12MM STANDARD

## (undated) DEVICE — DRAPE 3/4 SHEET W REINFORCEMENT 56X77"

## (undated) DEVICE — SUT BIOSYN 4-0 18" P-12

## (undated) DEVICE — SUT VLOC 180 0 12" GS-21 GREEN

## (undated) DEVICE — SOL IRR POUR H2O 250ML

## (undated) DEVICE — NDL HYPO REGULAR BEVEL 22G X 1.5" (TURQUOISE)

## (undated) DEVICE — DRAPE LIGHT HANDLE COVER (BLUE)

## (undated) DEVICE — APPLICATOR VISTASEAL LAP DUAL 35CM RIGID

## (undated) DEVICE — GLV 7 PROTEXIS (WHITE)

## (undated) DEVICE — WARMING BLANKET UPPER ADULT

## (undated) DEVICE — UTERINE MANIPULATOR CONMED VCARE SM 32MM

## (undated) DEVICE — Device

## (undated) DEVICE — SUT POLYSORB 0 30" GS-23

## (undated) DEVICE — SUT VLOC 180 0 9" GS-21 GREEN

## (undated) DEVICE — SOL IRR POUR NS 0.9% 500ML

## (undated) DEVICE — ENDOCATCH II 15MM

## (undated) DEVICE — TROCAR COVIDIEN BLUNT TIP HASSAN 10MM

## (undated) DEVICE — GLV 7.5 PROTEXIS (WHITE)

## (undated) DEVICE — GLV 6.5 PROTEXIS (WHITE)

## (undated) DEVICE — APPLICATOR FOR ARISTA XL 38CM

## (undated) DEVICE — TROCAR GELPOINT MINI ADVANCED

## (undated) DEVICE — DRSG STERISTRIPS 0.5 X 4"